# Patient Record
Sex: FEMALE | HISPANIC OR LATINO | Employment: UNEMPLOYED | URBAN - METROPOLITAN AREA
[De-identification: names, ages, dates, MRNs, and addresses within clinical notes are randomized per-mention and may not be internally consistent; named-entity substitution may affect disease eponyms.]

---

## 2017-09-22 ENCOUNTER — ALLSCRIPTS OFFICE VISIT (OUTPATIENT)
Dept: OTHER | Facility: OTHER | Age: 37
End: 2017-09-22

## 2017-10-25 ENCOUNTER — ALLSCRIPTS OFFICE VISIT (OUTPATIENT)
Dept: OTHER | Facility: OTHER | Age: 37
End: 2017-10-25

## 2017-10-25 DIAGNOSIS — F32.9 MAJOR DEPRESSIVE DISORDER, SINGLE EPISODE: ICD-10-CM

## 2017-10-26 NOTE — PROGRESS NOTES
Assessment  1  Depression with anxiety (300 4) (F41 8)    Plan  Acute depression    · Sertraline HCl - 50 MG Oral Tablet; TAKE 1 TABLET DAILY   · (1) CBC/PLT/DIFF; Status:Active; Requested [id number];    · (1) COMPREHENSIVE METABOLIC PANEL; Status:Active; Requested [id number];    · (1) TSH; Status:Active; Requested EXH:[id number];   Flu vaccine need    · Fluzone Quadrivalent Intramuscular Suspension    Discussion/Summary    Discussed uses of meds and possible side effects such as gi upset, headache, dreams, wt gain  will start meds and get lab eval  will consider mental health eval if not improving  Chief Complaint  1  Anxiety   2  Depression  pt wants to discuss depression and anxiety issues      History of Present Illness  pt with 36 year old thinks she started with depression post partum, feeling empty, always tired  is getting work done at home meals laundry etc  but feels unhappy  no thought of self harm or harming others      Review of Systems    Constitutional: feeling poorly  Eyes: No complaints of eye pain, no red eyes, no eyesight problems, no discharge, no dry eyes, no itching of eyes  ENT: no complaints of earache, no loss of hearing, no nose bleeds, no nasal discharge, no sore throat, no hoarseness  Cardiovascular: No complaints of slow heart rate, no fast heart rate, no chest pain, no palpitations, no leg claudication, no lower extremity edema  Respiratory: No complaints of shortness of breath, no wheezing, no cough, no SOB on exertion, no orthopnea, no PND  Gastrointestinal: No complaints of abdominal pain, no constipation, no nausea or vomiting, no diarrhea, no bloody stools  Genitourinary: No complaints of dysuria, no incontinence, no pelvic pain, no dysmenorrhea, no vaginal discharge or bleeding  Musculoskeletal: No complaints of arthralgias, no myalgias, no joint swelling or stiffness, no limb pain or swelling     Integumentary: No complaints of skin rash or lesions, no itching, no skin wounds, no breast pain or lump  Neurological: No complaints of headache, no confusion, no convulsions, no numbness, no dizziness or fainting, no tingling, no limb weakness, no difficulty walking  Psychiatric: as noted in HPI  Endocrine: No complaints of proptosis, no hot flashes, no muscle weakness, no deepening of the voice, no feelings of weakness  Hematologic/Lymphatic: No complaints of swollen glands, no swollen glands in the neck, does not bleed easily, does not bruise easily  Active Problems  1  Asthma (493 90) (J45 909)   2  Contraception (V25 9) (Z30 9)   3  Encounter for routine gynecological examination with Papanicolaou smear of cervix   (V72 31,V76 2) (Z01 419)   4  Need for Tdap vaccination (V06 1) (Z23)   5  Pregnancy (V22 2) (Z34 90)   6  Strain of lumbar region, initial encounter (847 2) (M29 681X)    Past Medical History  1  History of  (637 90) (O03 9)   2  History of ASCUS favor benign (796 9)   3  History of Bladder Incontinence   4  History of  3   5  History of Male infertility (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 9)   6  History of  (spontaneous vaginal delivery) (650) (O80)    The active problems and past medical history were reviewed and updated today  Surgical History  1  History of Surgically Induced  - By Dilation And Evacuation    Family History  Mother    1  Family history of Urinary Incontinence  Father    2  Family history of alcoholism (V17 0) (Z81 1)  Sister    3  Family history of Urinary Incontinence  Maternal Grandmother    4  Family history of Arthritis (V17 7)   5  Family history of Hypertension (V17 49)   6  Family history of Osteoporosis (V17 81)   7  Family history of Renal Disease  Maternal Grandfather    8  Family history of Diabetes Mellitus (V18 0)  Maternal Aunt    9  Family history of Anemia (V18 2)  Paternal Aunt    8  Family history of Anemia (V18 2)   11   Family history of Varicose Veins Of Lower Extremities    Social History · Always uses seat belt   · Being A Social Drinker   · Exercising Regularly   · Former smoker (T68 88) (P56 593)   ·   The social history was reviewed and updated today  The social history was reviewed and is unchanged  Current Meds   1  Ibuprofen 600 MG Oral Tablet; TAKE 1 TABLET 3 times daily; Therapy: 86RAY6642 to (Evaluate:13Apr2016)  Requested for: 99YOD6472; Last   Rx:[id number] Ordered   2  Prenatal 28-0 8 MG Oral Tablet; Take 1 tablet daily Recorded   3  Proventil  (90 Base) MCG/ACT Inhalation Aerosol Solution; 2 PUFFS INH PRN; Therapy: 48SUB1374 to (Evaluate:19Rkj8768)  Requested for: 35DPR2190; Last   Rx:09Mar2017 Ordered   4  ZyrTEC Allergy 10 MG Oral Capsule; TAKE 1 CAPSULE Daily Recorded    The medication list was reviewed and updated today  Allergies  1  No Known Drug Allergies  2  Milk   3  Pollen    Vitals  Vital Signs    Recorded: 02STU8834 10:36AM   Heart Rate 74   Systolic 385   Diastolic 80   Height 5 ft 7 5 in   Weight 239 lb    BMI Calculated 36 88   BSA Calculated 2 19     Physical Exam    Constitutional   General appearance: No acute distress, well appearing and well nourished  Pulmonary   Auscultation of lungs: Clear to auscultation  Cardiovascular   Auscultation of heart: Normal rate and rhythm, normal S1 and S2, without murmurs  Examination of extremities for edema and/or varicosities: Normal     Lymphatic   Palpation of lymph nodes in neck: No lymphadenopathy  Skin   Skin and subcutaneous tissue: Normal without rashes or lesions  Psychiatric   Orientation to person, place, and time: Normal     Mood and affect: Abnormal   Mood and Affect: flat-- and-- restricted  Results/Data  PHQ-9 Adult Depression Screening 25Oct2017 10:38AM User, Ahs     Test Name Result Flag Reference   PHQ-9 Adult Depression Score 18     Over the last two weeks, how often have you been bothered by any of the following problems?   Little interest or pleasure in doing things: Nearly every day - 3  Feeling down, depressed, or hopeless: Nearly every day - 3  Trouble falling or staying asleep, or sleeping too much: More than half the days - 2  Feeling tired or having little energy: Nearly every day - 3  Poor appetite or over eating: Nearly every day - 3  Feeling bad about yourself - or that you are a failure or have let yourself or your family down: Nearly every day - 3  Trouble concentrating on things, such as reading the newspaper or watching television: Several days - 1  Moving or speaking so slowly that other people could have noticed  Or the opposite -  being so fidgety or restless that you have been moving around a lot more than usual: Not at all - 0  Thoughts that you would be better off dead, or of hurting yourself in some way: Not at all - 0   PHQ-9 Adult Depression Screening Positive     PHQ-9 Difficulty Level Not difficult at all     PHQ-9 Severity      Moderately Severe Depression       Future Appointments    Date/Time Provider Specialty Site   11/15/2017 10:30 AM Raoul Judge DO Family Medicine [place] [address]   09/28/2018 10:20 AM GIO Barakat   Obstetrics/Gynecology [facility]     Signatures   Electronically signed by : Lloyd Michelle DO; Oct 25 2017  1:25PM EST                       (Author)

## 2017-10-27 ENCOUNTER — LAB CONVERSION - ENCOUNTER (OUTPATIENT)
Dept: OTHER | Facility: OTHER | Age: 37
End: 2017-10-27

## 2017-10-27 ENCOUNTER — GENERIC CONVERSION - ENCOUNTER (OUTPATIENT)
Dept: OTHER | Facility: OTHER | Age: 37
End: 2017-10-27

## 2017-10-27 LAB
A/G RATIO (HISTORICAL): 1.6 (CALC) (ref 1–2.5)
ALBUMIN SERPL BCP-MCNC: 4.3 G/DL (ref 3.6–5.1)
ALP SERPL-CCNC: 82 U/L (ref 33–115)
ALT SERPL W P-5'-P-CCNC: 29 U/L (ref 6–29)
AST SERPL W P-5'-P-CCNC: 22 U/L (ref 10–30)
BASOPHILS # BLD AUTO: 0.6 %
BASOPHILS # BLD AUTO: 38 CELLS/UL (ref 0–200)
BILIRUB SERPL-MCNC: 0.5 MG/DL (ref 0.2–1.2)
BUN SERPL-MCNC: 12 MG/DL (ref 7–25)
BUN/CREA RATIO (HISTORICAL): NORMAL (CALC) (ref 6–22)
CALCIUM SERPL-MCNC: 9.2 MG/DL (ref 8.6–10.2)
CHLORIDE SERPL-SCNC: 103 MMOL/L (ref 98–110)
CO2 SERPL-SCNC: 27 MMOL/L (ref 20–31)
CREAT SERPL-MCNC: 0.83 MG/DL (ref 0.5–1.1)
DEPRECATED RDW RBC AUTO: 12.5 % (ref 11–15)
EGFR AFRICAN AMERICAN (HISTORICAL): 106 ML/MIN/1.73M2
EGFR-AMERICAN CALC (HISTORICAL): 91 ML/MIN/1.73M2
EOSINOPHIL # BLD AUTO: 198 CELLS/UL (ref 15–500)
EOSINOPHIL # BLD AUTO: 3.1 %
GAMMA GLOBULIN (HISTORICAL): 2.7 G/DL (CALC) (ref 1.9–3.7)
GLUCOSE (HISTORICAL): 97 MG/DL (ref 65–99)
HCT VFR BLD AUTO: 37.1 % (ref 35–45)
HGB BLD-MCNC: 12.2 G/DL (ref 11.7–15.5)
LYMPHOCYTES # BLD AUTO: 1606 CELLS/UL (ref 850–3900)
LYMPHOCYTES # BLD AUTO: 25.1 %
MCH RBC QN AUTO: 28.6 PG (ref 27–33)
MCHC RBC AUTO-ENTMCNC: 32.9 G/DL (ref 32–36)
MCV RBC AUTO: 87.1 FL (ref 80–100)
MONOCYTES # BLD AUTO: 480 CELLS/UL (ref 200–950)
MONOCYTES (HISTORICAL): 7.5 %
NEUTROPHILS # BLD AUTO: 4077 CELLS/UL (ref 1500–7800)
NEUTROPHILS # BLD AUTO: 63.7 %
PLATELET # BLD AUTO: 318 THOUSAND/UL (ref 140–400)
PMV BLD AUTO: 10.8 FL (ref 7.5–12.5)
POTASSIUM SERPL-SCNC: 4 MMOL/L (ref 3.5–5.3)
RBC # BLD AUTO: 4.26 MILLION/UL (ref 3.8–5.1)
SODIUM SERPL-SCNC: 137 MMOL/L (ref 135–146)
TOTAL PROTEIN (HISTORICAL): 7 G/DL (ref 6.1–8.1)
TSH SERPL DL<=0.05 MIU/L-ACNC: 9.42 MIU/L
WBC # BLD AUTO: 6.4 THOUSAND/UL (ref 3.8–10.8)

## 2017-11-15 ENCOUNTER — GENERIC CONVERSION - ENCOUNTER (OUTPATIENT)
Dept: OTHER | Facility: OTHER | Age: 37
End: 2017-11-15

## 2018-01-10 NOTE — RESULT NOTES
Verified Results  (1) CBC/PLT/DIFF 26Oct2017 08:54AM Rianna Hernández     Test Name Result Flag Reference   WHITE BLOOD CELL COUNT 6 4 Thousand/uL  3 8-10 8   RED BLOOD CELL COUNT 4 26 Million/uL  3 80-5 10   HEMOGLOBIN 12 2 g/dL  11 7-15 5   HEMATOCRIT 37 1 %  35 0-45 0   MCV 87 1 fL  80 0-100 0   MCH 28 6 pg  27 0-33 0   MCHC 32 9 g/dL  32 0-36 0   RDW 12 5 %  11 0-15 0   PLATELET COUNT 066 Thousand/uL  140-400   ABSOLUTE NEUTROPHILS 4077 cells/uL  5835-2777   ABSOLUTE LYMPHOCYTES 1606 cells/uL  850-3900   ABSOLUTE MONOCYTES 480 cells/uL  200-950   ABSOLUTE EOSINOPHILS 198 cells/uL     ABSOLUTE BASOPHILS 38 cells/uL  0-200   NEUTROPHILS 63 7 %     LYMPHOCYTES 25 1 %     MONOCYTES 7 5 %     EOSINOPHILS 3 1 %     BASOPHILS 0 6 %     MPV 10 8 fL  7 5-12 5     (1) COMPREHENSIVE METABOLIC PANEL [id number] 01:81NH Rianna Fryeoscar     Test Name Result Flag Reference   GLUCOSE 97 mg/dL  65-99   Fasting reference interval   UREA NITROGEN (BUN) 12 mg/dL  7-25   CREATININE 0 83 mg/dL  0 50-1 10   eGFR NON-AFR   AMERICAN 91 mL/min/1 73m2  > OR = 60   eGFR AFRICAN AMERICAN 106 mL/min/1 73m2  > OR = 60   BUN/CREATININE RATIO   8-87   NOT APPLICABLE (calc)   SODIUM 137 mmol/L  135-146   POTASSIUM 4 0 mmol/L  3 5-5 3   CHLORIDE 103 mmol/L     CARBON DIOXIDE 27 mmol/L  20-31   CALCIUM 9 2 mg/dL  8 6-10 2   PROTEIN, TOTAL 7 0 g/dL  6 1-8 1   ALBUMIN 4 3 g/dL  3 6-5 1   GLOBULIN 2 7 g/dL (calc)  1 9-3 7   ALBUMIN/GLOBULIN RATIO 1 6 (calc)  1 0-2 5   BILIRUBIN, TOTAL 0 5 mg/dL  0 2-1 2   ALKALINE PHOSPHATASE 82 U/L     AST 22 U/L  10-30   ALT 29 U/L  6-29     (Q) TSH, 3RD GENERATION 26Oct2017 08:54AM Rianna Hernández   REPORT COMMENT:  FASTING:NO     Test Name Result Flag Reference   TSH 9 42 mIU/L H    Reference Range                         > or = 20 Years  0 40-4 50                              Pregnancy Ranges            First trimester    0 26-2 66            Second trimester   0 55-2 73            Third trimester    0 43-2 91 Plan  Acquired hypothyroidism    · Levothyroxine Sodium 25 MCG Oral Tablet;  Take 1 tablet daily

## 2018-01-12 VITALS
DIASTOLIC BLOOD PRESSURE: 80 MMHG | SYSTOLIC BLOOD PRESSURE: 116 MMHG | HEIGHT: 68 IN | BODY MASS INDEX: 36.22 KG/M2 | WEIGHT: 239 LBS | HEART RATE: 74 BPM

## 2018-01-14 VITALS
SYSTOLIC BLOOD PRESSURE: 110 MMHG | DIASTOLIC BLOOD PRESSURE: 64 MMHG | WEIGHT: 235 LBS | BODY MASS INDEX: 35.61 KG/M2 | HEIGHT: 68 IN

## 2018-01-15 DIAGNOSIS — F32.9 MAJOR DEPRESSIVE DISORDER, SINGLE EPISODE: ICD-10-CM

## 2018-01-15 NOTE — PROGRESS NOTES
Discussion/Summary  health maintenance visit healthy adult female Currently, she eats a healthy diet and eats an adequate diet  the risks and benefits of cervical cancer screening were discussed cervical cancer screening is current Breast cancer screening: the risks and benefits of breast cancer screening were discussed and self breast exam technique was taught  Advice and education were given regarding nutrition, aerobic exercise, weight bearing exercise, calcium supplements and vitamin D supplements  Gynecologic yearly, without abnormal findings  Pap smear is current  2014  She had an ASCUS with a negative HPV  2015  She had a normal, her next Pap smear is due in 2017  We discussed strategies for dealing with PMS, which included exercise and more free time  Chief Complaint  Pt is here for her yearly exam, c/o hormonal changes  History of Present Illness  HPI: Patient is a 70-year-old female here for yearly gynecologic exam  She complains of increased moodiness and anger one week before the onset of menses  Patient is breast-feeding and not sleeping well at night  She does not exercise regularly  She denies missing work, Troubles at work, difficulty with marriage, or suicidal ideation  GYN HM, [facility]: The patient is being seen for a health maintenance evaluation  The last health maintenance visit was 1 year(s) ago  General Health: The patient's health since the last visit is described as good  Lifestyle:  She exercises regularly  She exercises less than three times a week  Exercise includes walking  She does not use tobacco  She consumes alcohol  She reports occasional alcohol use  She denies drug use  Reproductive health: the patient is premenopausal   she reports no menstrual problems  she uses no contraception  she is sexually active  pregnancy history: G 1P 1, 1  Screening: Cervical cancer screening includes a pap smear performed 10/7/2015,neg    and human papilloma virus screening performed 10/29/2014,neg  Breast cancer screening includes no previous mammogram  Colorectal cancer screening includes no previous colonoscopy  Review of Systems  no pelvic pain and no menorrhagia  Constitutional: No fever, no chills, feels well, no tiredness, no recent weight gain or loss  Breasts: no breast pain and no reddening of the breast       Active Problems    1  Asthma (493 90) (J45 909)   2  Contraception (V25 9) (Z30 9)   3  Encounter for routine gynecological examination with Papanicolaou smear of cervix   (V72 31,V76 2) (Z01 419)   4  Need for Tdap vaccination (V06 1) (Z23)   5  Pregnancy (V22 2) (Z33 1)   6  Strain of lumbar region, initial encounter (847 2) (S39 012A)    Past Medical History    · History of  (637 90) (O03 9)   · History of ASCUS favor benign (796 9)   · History of Bladder Incontinence   · History of  3   · History of Male infertility (606 9) (N46 9)   · History of  (spontaneous vaginal delivery) (650) (O80)    Surgical History    · History of Surgically Induced  - By Dilation And Evacuation    Family History  Mother    · Family history of Urinary Incontinence  Father    · Family history of alcoholism (V17 0) (Z81 1)  Sister    · Family history of Urinary Incontinence  Maternal Grandmother    · Family history of Arthritis (V17 7)   · Family history of Hypertension (V17 49)   · Family history of Osteoporosis (V17 81)   · Family history of Renal Disease  Maternal Grandfather    · Family history of Diabetes Mellitus (V18 0)  Maternal Aunt    · Family history of Anemia (V18 2)  Paternal Aunt    · Family history of Anemia (V18 2)   · Family history of Varicose Veins Of Lower Extremities    Social History    · Always uses seat belt   · Being A Social Drinker   · Exercising Regularly   · Former smoker (T44 96) (W13 965)   ·     Current Meds   1  Ibuprofen 600 MG Oral Tablet; TAKE 1 TABLET 3 times daily;    Therapy: 76TOC2301 to (Evaluate:74Amu5760)  Requested for: 48YCQ7958; Last   Rx:[id number] Ordered   2  Prenatal 28-0 8 MG Oral Tablet; Take 1 tablet daily Recorded   3  Proventil  (90 Base) MCG/ACT Inhalation Aerosol Solution; 2 PUFFS INH PRN; Therapy: 35EAX7596 to (Misty Hurd)  Requested for: 15XGX2014; Last   Rx:[id number] Ordered   4  ZyrTEC Allergy 10 MG Oral Capsule; TAKE 1 CAPSULE Daily Recorded    Allergies    1  No Known Drug Allergies    2  Milk   3  Pollen    Vitals   Recorded: 54ZGW7218 02:83EU   Systolic 327   Diastolic 68   LMP 59TRR8016   Height 5 ft 7 5 in   Weight 228 lb    BMI Calculated 35 18   BSA Calculated 2 15     Physical Exam    Constitutional   General appearance: No acute distress, well appearing and well nourished  Neck   Neck: Normal, supple, trachea midline, no masses  Thyroid: Normal, no thyromegaly  Pulmonary   Respiratory effort: No increased work of breathing or signs of respiratory distress  Auscultation of lungs: Clear to auscultation  Cardiovascular   Auscultation of heart: Normal rate and rhythm, normal S1 and S2, no murmurs  Peripheral vascular exam: Normal pulses Throughout  Genitourinary   External genitalia: Normal and no lesions appreciated  Vagina: Normal, no lesions or dryness appreciated  Urethra: Normal     Urethral meatus: Normal     Bladder: Normal, soft, non-tender and no prolapse or masses appreciated  Anus, perineum, and rectum: Normal sphincter tone, no masses, and no prolapse  Chest Normal lactating breasts  Abdomen   Abdomen: Normal, non-tender, and no organomegaly noted  Liver and spleen: No hepatomegaly or splenomegaly  Examination for hernias: No hernias appreciated  Lymphatic   Palpation of lymph nodes in neck, axillae, groin and/or other locations: No lymphadenopathy or masses noted  Skin   Skin and subcutaneous tissue: Normal skin turgor and no rashes      Palpation of skin and subcutaneous tissue: Normal  Psychiatric   Orientation to person, place, and time: Normal     Mood and affect: Normal        Future Appointments    Date/Time Provider Specialty Site   09/22/2017 10:20 AM GIO Mederos   Obstetrics/Gynecology [facility] OB     Signatures   Electronically signed by : GIO Escoto ; Sep  9 2016  1:20PM EST                       (Author)

## 2018-01-22 VITALS
DIASTOLIC BLOOD PRESSURE: 78 MMHG | HEART RATE: 76 BPM | SYSTOLIC BLOOD PRESSURE: 128 MMHG | HEIGHT: 68 IN | WEIGHT: 240.4 LBS | BODY MASS INDEX: 36.43 KG/M2 | RESPIRATION RATE: 20 BRPM

## 2018-11-01 ENCOUNTER — ANNUAL EXAM (OUTPATIENT)
Dept: OBGYN CLINIC | Facility: MEDICAL CENTER | Age: 38
End: 2018-11-01
Payer: COMMERCIAL

## 2018-11-01 ENCOUNTER — APPOINTMENT (OUTPATIENT)
Dept: LAB | Facility: MEDICAL CENTER | Age: 38
End: 2018-11-01
Payer: COMMERCIAL

## 2018-11-01 VITALS
BODY MASS INDEX: 35.88 KG/M2 | WEIGHT: 228.6 LBS | DIASTOLIC BLOOD PRESSURE: 82 MMHG | SYSTOLIC BLOOD PRESSURE: 122 MMHG | HEIGHT: 67 IN

## 2018-11-01 DIAGNOSIS — E02 SUBCLINICAL IODINE-DEFICIENCY HYPOTHYROIDISM: ICD-10-CM

## 2018-11-01 DIAGNOSIS — Z01.419 ENCOUNTER FOR GYNECOLOGICAL EXAMINATION (GENERAL) (ROUTINE) WITHOUT ABNORMAL FINDINGS: Primary | ICD-10-CM

## 2018-11-01 DIAGNOSIS — Z11.51 SCREENING FOR HUMAN PAPILLOMAVIRUS (HPV): ICD-10-CM

## 2018-11-01 DIAGNOSIS — E66.3 OVERWEIGHT: ICD-10-CM

## 2018-11-01 DIAGNOSIS — Z12.4 SCREENING FOR CERVICAL CANCER: ICD-10-CM

## 2018-11-01 PROCEDURE — 84439 ASSAY OF FREE THYROXINE: CPT

## 2018-11-01 PROCEDURE — 84443 ASSAY THYROID STIM HORMONE: CPT

## 2018-11-01 PROCEDURE — 99395 PREV VISIT EST AGE 18-39: CPT | Performed by: OBSTETRICS & GYNECOLOGY

## 2018-11-01 PROCEDURE — 87624 HPV HI-RISK TYP POOLED RSLT: CPT | Performed by: OBSTETRICS & GYNECOLOGY

## 2018-11-01 PROCEDURE — G0145 SCR C/V CYTO,THINLAYER,RESCR: HCPCS | Performed by: OBSTETRICS & GYNECOLOGY

## 2018-11-01 PROCEDURE — 36415 COLL VENOUS BLD VENIPUNCTURE: CPT

## 2018-11-01 RX ORDER — ALBUTEROL SULFATE 90 UG/1
2 AEROSOL, METERED RESPIRATORY (INHALATION)
COMMUNITY
Start: 2014-10-10 | End: 2019-05-23 | Stop reason: SDUPTHER

## 2018-11-01 RX ORDER — OMEPRAZOLE 10 MG/1
CAPSULE, DELAYED RELEASE ORAL
COMMUNITY
End: 2020-06-12

## 2018-11-01 NOTE — PROGRESS NOTES
Assessment/Plan:      Diagnoses and all orders for this visit:    Encounter for gynecological examination (general) (routine) without abnormal findings  -     Liquid-based pap, screening    Screening for human papillomavirus (HPV)  -     Liquid-based pap, screening    Screening for cervical cancer  -     Liquid-based pap, screening    Overweight  -     TSH, 3rd generation with Free T4 reflex; Future    Subclinical iodine-deficiency hypothyroidism  -     TSH, 3rd generation with Free T4 reflex; Future    Other orders  -     omeprazole (PRILOSEC) 10 mg delayed release capsule; Take by mouth  -     albuterol (PROVENTIL HFA) 90 mcg/act inhaler; Inhale 2 puffs  -     Cetirizine HCl (ZYRTEC ALLERGY) 10 MG CAPS; Take 1 capsule by mouth daily          Subjective:     Patient ID: Alonzo Lewis is a 40 y o  female  Patient is a 23-year-old female, para 1, here for yearly gynecologic exam without complaint  Review of systems is negative for vulvar vaginal or anal irritation  Negative for pelvic or abdominal pain  Negative for breast complaints  Patient states she gets regular menses  Past medical history is significant for mild asthma and possible hypothyroidism  Past surgical history none  Current medications are Proventil and Prilosec  No known drug allergies  Patient denies a family history of breast ovarian or colon cancer  Gynecologic Exam   The patient's pertinent negatives include no pelvic pain  Pertinent negatives include no abdominal pain  Review of Systems   Gastrointestinal: Negative for abdominal pain and rectal pain  Genitourinary: Negative for genital sores, menstrual problem and pelvic pain  Objective:     Physical Exam   Constitutional: She is oriented to person, place, and time  She appears well-developed and well-nourished  Neck: Neck supple  No thyromegaly present  Cardiovascular: Normal rate and regular rhythm      Pulmonary/Chest: Effort normal  No respiratory distress  She has no wheezes  She has no rales  She exhibits no tenderness  Right breast exhibits no inverted nipple, no mass, no nipple discharge, no skin change and no tenderness  Left breast exhibits no inverted nipple, no mass, no nipple discharge, no skin change and no tenderness  Abdominal: Soft  She exhibits no distension and no mass  There is no tenderness  There is no rebound and no guarding  Genitourinary: Vagina normal and uterus normal    Neurological: She is alert and oriented to person, place, and time  Skin: Skin is warm  Psychiatric: She has a normal mood and affect   Her behavior is normal

## 2018-11-02 ENCOUNTER — TELEPHONE (OUTPATIENT)
Dept: OBGYN CLINIC | Facility: CLINIC | Age: 38
End: 2018-11-02

## 2018-11-02 LAB
T4 FREE SERPL-MCNC: 1 NG/DL (ref 0.76–1.46)
TSH SERPL DL<=0.05 MIU/L-ACNC: 11 UIU/ML (ref 0.36–3.74)

## 2018-11-02 NOTE — TELEPHONE ENCOUNTER
----- Message from Newton Holbrook MD sent at 11/2/2018  8:40 AM EDT -----  Please notify patient her TSH is elevated her free T4 is normal   Suggestive of subclinical hypothyroidism  She should continue to follow with her primary care doctor

## 2018-11-02 NOTE — TELEPHONE ENCOUNTER
Patient called back - stated she found a PCP and will be going to him for there medication  Don't need to prescribe pt anything at this time

## 2018-11-02 NOTE — TELEPHONE ENCOUNTER
Patient is aware of TSH & T4 results  She stated she doesn't have a PCP and was told that Dr Meghan Ragland would prescribe her something for it till she does find one  Please advise  Thanks!

## 2018-11-05 LAB
HPV HR 12 DNA CVX QL NAA+PROBE: NEGATIVE
HPV16 DNA CVX QL NAA+PROBE: NEGATIVE
HPV18 DNA CVX QL NAA+PROBE: NEGATIVE

## 2018-11-07 LAB
LAB AP GYN PRIMARY INTERPRETATION: NORMAL
Lab: NORMAL

## 2018-11-08 ENCOUNTER — OFFICE VISIT (OUTPATIENT)
Dept: FAMILY MEDICINE CLINIC | Facility: CLINIC | Age: 38
End: 2018-11-08
Payer: COMMERCIAL

## 2018-11-08 VITALS
HEIGHT: 68 IN | WEIGHT: 226 LBS | OXYGEN SATURATION: 98 % | HEART RATE: 88 BPM | BODY MASS INDEX: 34.25 KG/M2 | RESPIRATION RATE: 16 BRPM | DIASTOLIC BLOOD PRESSURE: 76 MMHG | TEMPERATURE: 98.2 F | SYSTOLIC BLOOD PRESSURE: 98 MMHG

## 2018-11-08 DIAGNOSIS — E66.9 CLASS 1 OBESITY WITHOUT SERIOUS COMORBIDITY WITH BODY MASS INDEX (BMI) OF 34.0 TO 34.9 IN ADULT, UNSPECIFIED OBESITY TYPE: ICD-10-CM

## 2018-11-08 DIAGNOSIS — Z23 FLU VACCINE NEED: Primary | ICD-10-CM

## 2018-11-08 DIAGNOSIS — E03.9 ACQUIRED HYPOTHYROIDISM: ICD-10-CM

## 2018-11-08 PROCEDURE — 90686 IIV4 VACC NO PRSV 0.5 ML IM: CPT | Performed by: INTERNAL MEDICINE

## 2018-11-08 PROCEDURE — 1036F TOBACCO NON-USER: CPT | Performed by: INTERNAL MEDICINE

## 2018-11-08 PROCEDURE — 99203 OFFICE O/P NEW LOW 30 MIN: CPT | Performed by: INTERNAL MEDICINE

## 2018-11-08 PROCEDURE — 3008F BODY MASS INDEX DOCD: CPT | Performed by: INTERNAL MEDICINE

## 2018-11-08 PROCEDURE — 90471 IMMUNIZATION ADMIN: CPT | Performed by: INTERNAL MEDICINE

## 2018-11-08 RX ORDER — LEVOTHYROXINE SODIUM 0.05 MG/1
50 TABLET ORAL DAILY
Qty: 30 TABLET | Refills: 1 | Status: SHIPPED | OUTPATIENT
Start: 2018-11-08 | End: 2018-11-14 | Stop reason: SDUPTHER

## 2018-11-08 NOTE — PROGRESS NOTES
Assessment/Plan:         Diagnoses and all orders for this visit:    Flu vaccine need  -     SYRINGE/SINGLE-DOSE VIAL: influenza vaccine, 2713-2417, quadrivalent, 0 5 mL, preservative-free, for patients 3+ yr (FLUZONE)    Acquired hypothyroidism  -     levothyroxine 50 mcg tablet; Take 1 tablet (50 mcg total) by mouth daily  -     TSH, 3rd generation; Future  -     T4, free; Future    Class 1 obesity without serious comorbidity with body mass index (BMI) of 34 0 to 34 9 in adult, unspecified obesity type    Other orders  -     Prenatal Vit-Fe Fumarate-FA (PRENATAL VITAMIN PO); Take by mouth          Subjective:      Patient ID: Romana Schmidt is a 40 y o  female  Pt ran out of thyroid med  She moved and had to find a new dr   +fatigued and it is worse off med  Off her med at least 1 month if not more  Loosing some hair  Denies constip  +depressed  Needs to go back on med        The following portions of the patient's history were reviewed and updated as appropriate:   She  has a past medical history of Bladder incontinence; Disease of thyroid gland; Mental disorder; and Pap smear abnormality of cervix with ASCUS favoring benign (10/29/2014)  She   Patient Active Problem List    Diagnosis Date Noted    Acquired hypothyroidism 10/27/2017    Strain of lumbar region 10/16/2015    Asthma 10/10/2014     She  has a past surgical history that includes Dilation and evacuation  Her family history includes Alcohol abuse in her father and mother; Anemia in her maternal aunt and paternal aunt; Arthritis in her maternal grandmother; Diabetes in her maternal grandfather; Hypertension in her maternal grandmother; Kidney disease in her maternal grandmother; Mental illness in her father and mother; Osteoporosis in her maternal grandmother; Other in her mother and sister; Substance Abuse in her father and mother; Varicose Veins in her paternal aunt  She  reports that she has quit smoking   She has never used smokeless tobacco  She reports that she drinks alcohol  She reports that she does not use drugs  Current Outpatient Prescriptions   Medication Sig Dispense Refill    albuterol (PROVENTIL HFA) 90 mcg/act inhaler Inhale 2 puffs      Cetirizine HCl (ZYRTEC ALLERGY) 10 MG CAPS Take 1 capsule by mouth daily      omeprazole (PRILOSEC) 10 mg delayed release capsule Take by mouth      Prenatal Vit-Fe Fumarate-FA (PRENATAL VITAMIN PO) Take by mouth      levothyroxine 50 mcg tablet Take 1 tablet (50 mcg total) by mouth daily 30 tablet 1     No current facility-administered medications for this visit  Current Outpatient Prescriptions on File Prior to Visit   Medication Sig    albuterol (PROVENTIL HFA) 90 mcg/act inhaler Inhale 2 puffs    Cetirizine HCl (ZYRTEC ALLERGY) 10 MG CAPS Take 1 capsule by mouth daily    omeprazole (PRILOSEC) 10 mg delayed release capsule Take by mouth     No current facility-administered medications on file prior to visit  She is allergic to lac bovis and pollen extract       Review of Systems   Constitutional: Negative  HENT: Negative  Respiratory: Negative  Cardiovascular: Negative  Gastrointestinal: Negative for constipation  Objective:      BP 98/76 (BP Location: Right arm, Patient Position: Sitting, Cuff Size: Large)   Pulse 88   Temp 98 2 °F (36 8 °C) (Tympanic)   Resp 16   Ht 5' 7 5" (1 715 m)   Wt 103 kg (226 lb)   LMP 10/25/2018 (Exact Date)   SpO2 98%   BMI 34 87 kg/m²          Physical Exam   Constitutional: She appears well-developed and well-nourished  HENT:   Head: Normocephalic and atraumatic  Right Ear: External ear normal    Left Ear: External ear normal    Nose: Nose normal    Mouth/Throat: Oropharynx is clear and moist    Neck: Normal range of motion  Neck supple  Cardiovascular: Normal rate, regular rhythm and normal heart sounds  Pulmonary/Chest: Effort normal and breath sounds normal    Abdominal: Soft   Bowel sounds are normal

## 2018-11-14 DIAGNOSIS — E03.9 ACQUIRED HYPOTHYROIDISM: ICD-10-CM

## 2018-11-14 RX ORDER — LEVOTHYROXINE SODIUM 0.05 MG/1
50 TABLET ORAL DAILY
Qty: 90 TABLET | Refills: 1 | Status: SHIPPED | OUTPATIENT
Start: 2018-11-14 | End: 2018-11-16 | Stop reason: SDUPTHER

## 2018-11-16 DIAGNOSIS — E03.9 ACQUIRED HYPOTHYROIDISM: ICD-10-CM

## 2018-11-16 RX ORDER — LEVOTHYROXINE SODIUM 0.05 MG/1
50 TABLET ORAL DAILY
Qty: 90 TABLET | Refills: 1 | Status: SHIPPED | OUTPATIENT
Start: 2018-11-16 | End: 2019-01-08 | Stop reason: SDUPTHER

## 2019-01-06 DIAGNOSIS — E03.9 ACQUIRED HYPOTHYROIDISM: ICD-10-CM

## 2019-01-06 RX ORDER — LEVOTHYROXINE SODIUM 0.05 MG/1
50 TABLET ORAL DAILY
Qty: 90 TABLET | Refills: 0 | Status: CANCELLED | OUTPATIENT
Start: 2019-01-06

## 2019-01-07 DIAGNOSIS — E03.9 ACQUIRED HYPOTHYROIDISM: ICD-10-CM

## 2019-01-07 RX ORDER — LEVOTHYROXINE SODIUM 0.05 MG/1
50 TABLET ORAL DAILY
Qty: 90 TABLET | Refills: 0 | Status: CANCELLED | OUTPATIENT
Start: 2019-01-07

## 2019-01-08 DIAGNOSIS — E03.9 ACQUIRED HYPOTHYROIDISM: ICD-10-CM

## 2019-01-08 RX ORDER — LEVOTHYROXINE SODIUM 0.05 MG/1
50 TABLET ORAL DAILY
Qty: 90 TABLET | Refills: 0 | Status: SHIPPED | OUTPATIENT
Start: 2019-01-08 | End: 2019-01-10 | Stop reason: SDUPTHER

## 2019-01-08 RX ORDER — LEVOTHYROXINE SODIUM 0.05 MG/1
50 TABLET ORAL DAILY
Qty: 30 TABLET | Refills: 0 | Status: CANCELLED | OUTPATIENT
Start: 2019-01-08

## 2019-01-09 ENCOUNTER — APPOINTMENT (OUTPATIENT)
Dept: LAB | Facility: MEDICAL CENTER | Age: 39
End: 2019-01-09
Payer: COMMERCIAL

## 2019-01-09 DIAGNOSIS — E03.9 ACQUIRED HYPOTHYROIDISM: ICD-10-CM

## 2019-01-09 DIAGNOSIS — F32.9 MAJOR DEPRESSIVE DISORDER, SINGLE EPISODE: ICD-10-CM

## 2019-01-09 LAB
T4 FREE SERPL-MCNC: 1.17 NG/DL (ref 0.76–1.46)
TSH SERPL DL<=0.05 MIU/L-ACNC: 5.47 UIU/ML (ref 0.36–3.74)

## 2019-01-09 PROCEDURE — 36415 COLL VENOUS BLD VENIPUNCTURE: CPT

## 2019-01-09 PROCEDURE — 84439 ASSAY OF FREE THYROXINE: CPT

## 2019-01-09 PROCEDURE — 84443 ASSAY THYROID STIM HORMONE: CPT

## 2019-01-10 ENCOUNTER — TELEPHONE (OUTPATIENT)
Dept: FAMILY MEDICINE CLINIC | Facility: CLINIC | Age: 39
End: 2019-01-10

## 2019-01-10 DIAGNOSIS — E03.9 ACQUIRED HYPOTHYROIDISM: ICD-10-CM

## 2019-01-10 RX ORDER — LEVOTHYROXINE SODIUM 0.07 MG/1
75 TABLET ORAL DAILY
Qty: 30 TABLET | Refills: 1 | Status: SHIPPED | OUTPATIENT
Start: 2019-01-10 | End: 2019-01-15 | Stop reason: SDUPTHER

## 2019-01-13 DIAGNOSIS — E03.9 ACQUIRED HYPOTHYROIDISM: ICD-10-CM

## 2019-01-15 DIAGNOSIS — E03.9 ACQUIRED HYPOTHYROIDISM: ICD-10-CM

## 2019-01-15 RX ORDER — LEVOTHYROXINE SODIUM 0.07 MG/1
75 TABLET ORAL DAILY
Qty: 90 TABLET | Refills: 1 | Status: SHIPPED | OUTPATIENT
Start: 2019-01-15 | End: 2019-10-02 | Stop reason: SDUPTHER

## 2019-05-23 ENCOUNTER — OFFICE VISIT (OUTPATIENT)
Dept: FAMILY MEDICINE CLINIC | Facility: CLINIC | Age: 39
End: 2019-05-23
Payer: COMMERCIAL

## 2019-05-23 ENCOUNTER — APPOINTMENT (OUTPATIENT)
Dept: LAB | Facility: MEDICAL CENTER | Age: 39
End: 2019-05-23
Payer: COMMERCIAL

## 2019-05-23 ENCOUNTER — TELEPHONE (OUTPATIENT)
Dept: OTHER | Facility: OTHER | Age: 39
End: 2019-05-23

## 2019-05-23 VITALS
TEMPERATURE: 98.5 F | RESPIRATION RATE: 16 BRPM | WEIGHT: 210 LBS | HEART RATE: 78 BPM | DIASTOLIC BLOOD PRESSURE: 72 MMHG | BODY MASS INDEX: 31.83 KG/M2 | SYSTOLIC BLOOD PRESSURE: 110 MMHG | OXYGEN SATURATION: 98 % | HEIGHT: 68 IN

## 2019-05-23 DIAGNOSIS — E03.9 ACQUIRED HYPOTHYROIDISM: ICD-10-CM

## 2019-05-23 DIAGNOSIS — J06.9 UPPER RESPIRATORY TRACT INFECTION, UNSPECIFIED TYPE: Primary | ICD-10-CM

## 2019-05-23 LAB
T4 FREE SERPL-MCNC: 1.28 NG/DL (ref 0.76–1.46)
TSH SERPL DL<=0.05 MIU/L-ACNC: 3.39 UIU/ML (ref 0.36–3.74)

## 2019-05-23 PROCEDURE — 84439 ASSAY OF FREE THYROXINE: CPT

## 2019-05-23 PROCEDURE — 36415 COLL VENOUS BLD VENIPUNCTURE: CPT

## 2019-05-23 PROCEDURE — 84443 ASSAY THYROID STIM HORMONE: CPT

## 2019-05-23 PROCEDURE — 99213 OFFICE O/P EST LOW 20 MIN: CPT | Performed by: INTERNAL MEDICINE

## 2019-05-23 PROCEDURE — 3008F BODY MASS INDEX DOCD: CPT | Performed by: INTERNAL MEDICINE

## 2019-05-23 RX ORDER — ALBUTEROL SULFATE 90 UG/1
2 AEROSOL, METERED RESPIRATORY (INHALATION) EVERY 4 HOURS PRN
Qty: 1 INHALER | Refills: 2 | Status: SHIPPED | OUTPATIENT
Start: 2019-05-23 | End: 2020-06-12 | Stop reason: SDUPTHER

## 2019-05-23 RX ORDER — AMOXICILLIN 500 MG/1
500 CAPSULE ORAL EVERY 8 HOURS SCHEDULED
Qty: 30 CAPSULE | Refills: 0 | Status: SHIPPED | OUTPATIENT
Start: 2019-05-23 | End: 2019-06-02

## 2019-05-30 ENCOUNTER — OFFICE VISIT (OUTPATIENT)
Dept: FAMILY MEDICINE CLINIC | Facility: CLINIC | Age: 39
End: 2019-05-30
Payer: COMMERCIAL

## 2019-05-30 VITALS
HEIGHT: 68 IN | TEMPERATURE: 98.2 F | BODY MASS INDEX: 31.22 KG/M2 | RESPIRATION RATE: 16 BRPM | SYSTOLIC BLOOD PRESSURE: 110 MMHG | WEIGHT: 206 LBS | OXYGEN SATURATION: 98 % | DIASTOLIC BLOOD PRESSURE: 78 MMHG | HEART RATE: 82 BPM

## 2019-05-30 DIAGNOSIS — E03.9 ACQUIRED HYPOTHYROIDISM: Primary | ICD-10-CM

## 2019-05-30 DIAGNOSIS — J45.909 UNCOMPLICATED ASTHMA, UNSPECIFIED ASTHMA SEVERITY, UNSPECIFIED WHETHER PERSISTENT: ICD-10-CM

## 2019-05-30 PROCEDURE — 99213 OFFICE O/P EST LOW 20 MIN: CPT | Performed by: INTERNAL MEDICINE

## 2019-05-30 PROCEDURE — 1036F TOBACCO NON-USER: CPT | Performed by: INTERNAL MEDICINE

## 2019-10-02 ENCOUNTER — OFFICE VISIT (OUTPATIENT)
Dept: FAMILY MEDICINE CLINIC | Facility: CLINIC | Age: 39
End: 2019-10-02
Payer: COMMERCIAL

## 2019-10-02 VITALS
SYSTOLIC BLOOD PRESSURE: 100 MMHG | HEIGHT: 68 IN | RESPIRATION RATE: 16 BRPM | TEMPERATURE: 97.5 F | DIASTOLIC BLOOD PRESSURE: 76 MMHG | OXYGEN SATURATION: 98 % | HEART RATE: 79 BPM | WEIGHT: 181 LBS | BODY MASS INDEX: 27.43 KG/M2

## 2019-10-02 DIAGNOSIS — E03.9 ACQUIRED HYPOTHYROIDISM: ICD-10-CM

## 2019-10-02 DIAGNOSIS — R53.83 FATIGUE, UNSPECIFIED TYPE: ICD-10-CM

## 2019-10-02 DIAGNOSIS — Z23 ENCOUNTER FOR IMMUNIZATION: ICD-10-CM

## 2019-10-02 DIAGNOSIS — E66.3 OVERWEIGHT (BMI 25.0-29.9): Primary | ICD-10-CM

## 2019-10-02 PROCEDURE — 99213 OFFICE O/P EST LOW 20 MIN: CPT | Performed by: INTERNAL MEDICINE

## 2019-10-02 PROCEDURE — 3008F BODY MASS INDEX DOCD: CPT | Performed by: INTERNAL MEDICINE

## 2019-10-02 PROCEDURE — 90686 IIV4 VACC NO PRSV 0.5 ML IM: CPT | Performed by: INTERNAL MEDICINE

## 2019-10-02 PROCEDURE — 90471 IMMUNIZATION ADMIN: CPT | Performed by: INTERNAL MEDICINE

## 2019-10-02 RX ORDER — LEVOTHYROXINE SODIUM 0.07 MG/1
75 TABLET ORAL DAILY
Qty: 90 TABLET | Refills: 1 | Status: SHIPPED | OUTPATIENT
Start: 2019-10-02 | End: 2020-04-04

## 2019-10-02 NOTE — PROGRESS NOTES
Assessment/Plan:         Diagnoses and all orders for this visit:    Overweight (BMI 25 0-29 9)  -     Comprehensive metabolic panel; Future  -     Lipid panel; Future    Encounter for immunization  -     Cancel: influenza vaccine, [id number], quadrivalent, recombinant, PF, 0 5 mL, for patients 18 yr+ (FLUBLOK)  -     influenza vaccine, [id number], quadrivalent, 0 5 mL, preservative-free, for adult and pediatric patients 6 mos+ (AFLURIA, FLUARIX, FLULAVAL, FLUZONE)    Acquired hypothyroidism  -     levothyroxine 75 mcg tablet; Take 1 tablet (75 mcg total) by mouth daily  -     TSH, 3rd generation with Free T4 reflex; Future    Fatigue, unspecified type  -     CBC; Future          Subjective:      Patient ID: [place] He is a 41 y o  female  Pt needs rx for thyroid med  Had the flu shot  Denies s/s of hyper/hypo throid      The following portions of the patient's history were reviewed and updated as appropriate: She  has a past medical history of Bladder incontinence, Disease of thyroid gland, Mental disorder, and Pap smear abnormality of cervix with ASCUS favoring benign (10/29/2014)  She   Patient Active Problem List    Diagnosis Date Noted    Acquired hypothyroidism 10/27/2017    Strain of lumbar region 10/16/2015    Asthma 10/10/2014     She  has a past surgical history that includes Dilation and evacuation  Her family history includes Alcohol abuse in her father and mother; Anemia in her maternal aunt and paternal aunt; Arthritis in her maternal grandmother; Diabetes in her maternal grandfather; Hypertension in her maternal grandmother; Kidney disease in her maternal grandmother; Mental illness in her father and mother; Osteoporosis in her maternal grandmother; Other in her mother and sister; Substance Abuse in her father and mother; Varicose Veins in her paternal aunt  She  reports that she has quit smoking  She has never used smokeless tobacco  She reports that she drinks alcohol   She reports that she does not use drugs  Current Outpatient Medications   Medication Sig Dispense Refill    albuterol (PROVENTIL HFA) 90 mcg/act inhaler Inhale 2 puffs every 4 (four) hours as needed for wheezing 1 Inhaler 2    Cetirizine HCl (ZYRTEC ALLERGY) 10 MG CAPS Take 1 capsule by mouth daily      levothyroxine 75 mcg tablet Take 1 tablet (75 mcg total) by mouth daily 90 tablet 1    omeprazole (PRILOSEC) 10 mg delayed release capsule Take by mouth      Prenatal Vit-Fe Fumarate-FA (PRENATAL VITAMIN PO) Take by mouth       No current facility-administered medications for this visit  Current Outpatient Medications on File Prior to Visit   Medication Sig    albuterol (PROVENTIL HFA) 90 mcg/act inhaler Inhale 2 puffs every 4 (four) hours as needed for wheezing    Cetirizine HCl (ZYRTEC ALLERGY) 10 MG CAPS Take 1 capsule by mouth daily    omeprazole (PRILOSEC) 10 mg delayed release capsule Take by mouth    Prenatal Vit-Fe Fumarate-FA (PRENATAL VITAMIN PO) Take by mouth     No current facility-administered medications on file prior to visit  She is allergic to lac bovis and pollen extract       Review of Systems   Constitutional: Negative  HENT: Negative  Respiratory: Negative  Cardiovascular: Negative  Negative for palpitations  Gastrointestinal: Negative for diarrhea  Objective:      /76 (BP Location: Right arm, Patient Position: Sitting, Cuff Size: Large)   Pulse 79   Temp 97 5 °F (36 4 °C) (Tympanic)   Resp 16   Ht 5' 7 5" (1 715 m)   Wt 82 1 kg (181 lb)   LMP 09/24/2019 (Exact Date)   SpO2 98%   BMI 27 93 kg/m²          Physical Exam   Constitutional: She appears well-developed and well-nourished  No distress  HENT:   Head: Normocephalic and atraumatic  Right Ear: External ear normal    Left Ear: External ear normal    Nose: Nose normal    Mouth/Throat: Oropharynx is clear and moist  No oropharyngeal exudate  Neck: Neck supple  No tracheal deviation present   No thyromegaly present  Cardiovascular: Normal rate, regular rhythm, normal heart sounds and intact distal pulses  Exam reveals no gallop and no friction rub  No murmur heard  Pulmonary/Chest: Effort normal and breath sounds normal  No stridor  No respiratory distress  She has no wheezes  She has no rales  Lymphadenopathy:     She has no cervical adenopathy  Skin: She is not diaphoretic

## 2020-01-22 ENCOUNTER — ANNUAL EXAM (OUTPATIENT)
Dept: OBGYN CLINIC | Facility: MEDICAL CENTER | Age: 40
End: 2020-01-22
Payer: COMMERCIAL

## 2020-01-22 VITALS — SYSTOLIC BLOOD PRESSURE: 116 MMHG | DIASTOLIC BLOOD PRESSURE: 82 MMHG | BODY MASS INDEX: 26.7 KG/M2 | WEIGHT: 173 LBS

## 2020-01-22 DIAGNOSIS — R68.82 DECREASED LIBIDO WITHOUT SEXUAL DYSFUNCTION: ICD-10-CM

## 2020-01-22 DIAGNOSIS — Z01.419 ENCOUNTER FOR GYNECOLOGICAL EXAMINATION (GENERAL) (ROUTINE) WITHOUT ABNORMAL FINDINGS: Primary | ICD-10-CM

## 2020-01-22 PROCEDURE — 99395 PREV VISIT EST AGE 18-39: CPT | Performed by: NURSE PRACTITIONER

## 2020-01-22 NOTE — ASSESSMENT & PLAN NOTE
Decreased desire to initiate sex since her daughter was born 4+ yrs ago  Relationship is good although their schedules don't match well due to his work hours  Normal physical exam today  Reassurance provided that I have low suspicion of underlying medical cause for decreased libido; no labs or imaging indicated  Recommended scheduling time alone without daughter, as her mother is willing to have her overnight  Also advised scheduling date nights 1-2 times per week when timing for intercourse is best  She agrees to plan

## 2020-01-22 NOTE — PROGRESS NOTES
Assessment/Plan:    Decreased libido without sexual dysfunction  Decreased desire to initiate sex since her daughter was born 4+ yrs ago  Relationship is good although their schedules don't match well due to his work hours  Normal physical exam today  Reassurance provided that I have low suspicion of underlying medical cause for decreased libido; no labs or imaging indicated  Recommended scheduling time alone without daughter, as her mother is willing to have her overnight  Also advised scheduling date nights 1-2 times per week when timing for intercourse is best  She agrees to plan  Encounter for gynecological examination (general) (routine) without abnormal findings  Normal findings on routine gyn exam  Advised monthly SBE, annual CBE and baseline screening mammo at age 37  Reviewed ASCCP guidelines and recommended pap with cotesting q3 yrs for this low risk patient; this was noted to be up to date  STI testing was offered and the patient declines; she reports low risk  The patient declines contraception at this time  She is taking daily PNV  Diet/activity recommendations:  Encouraged daily Ca++ and vitamin D intake as well as daily weight bearing exercise for promotion of bone health  RTO in one year or sooner PRN  Diagnoses and all orders for this visit:    Encounter for gynecological examination (general) (routine) without abnormal findings    Decreased libido without sexual dysfunction          Subjective:      Patient ID: Zaria Labor is a 46 y o  female  This patient presents for routine annual gyn exam    Medically stable  C/o decreased libido since daughter was born  Relationship is good  Denies difficulty achieving orgasm or dyspareunia  They were planning another baby but they have not been having sex due to her lack of desire  She reports feeling like at the end of the day she does not have enough energy for intimacy   He goes to bed at 7 and leaves for work at 3:30 so timing is diffuclt  Her mother is happy to have daughter overnight but they don't often take advantage of this  She denies acute gyn complaints  Menses are regular and light to mod  She denies pelvic pain, breast concerns, abnormal discharge, bowel/bladder dysfunction, depression/anxiety   and monogamous  She denies STI concerns  Not using contraception and declines at this time  Daughter starts  in the fall       The following portions of the patient's history were reviewed and updated as appropriate: allergies, current medications, past family history, past medical history, past social history, past surgical history and problem list     Review of Systems   Constitutional: Negative  Respiratory: Negative  Cardiovascular: Negative  Gastrointestinal: Negative  Genitourinary: Negative  Musculoskeletal: Negative  Skin: Negative  Neurological: Negative  Psychiatric/Behavioral: Negative  Objective:      /82   Wt 78 5 kg (173 lb)   LMP 01/12/2020   BMI 26 70 kg/m²          Physical Exam   Constitutional: She is oriented to person, place, and time  She appears well-developed and well-nourished  HENT:   Head: Normocephalic and atraumatic  Eyes: Pupils are equal, round, and reactive to light  EOM are normal    Neck: Normal range of motion  Neck supple  No thyromegaly present  Cardiovascular: Normal rate, regular rhythm and normal heart sounds  Pulmonary/Chest: Effort normal and breath sounds normal  No respiratory distress  She has no wheezes  She has no rales  She exhibits no mass, no tenderness and no deformity  Right breast exhibits no inverted nipple, no mass, no nipple discharge, no skin change and no tenderness  Left breast exhibits no inverted nipple, no mass, no nipple discharge, no skin change and no tenderness  No breast tenderness or discharge  Breasts are symmetrical    Abdominal: Soft  She exhibits no distension and no mass   There is no splenomegaly or hepatomegaly  There is no tenderness  There is no rebound and no guarding  Genitourinary: Rectum normal, vagina normal and uterus normal  No breast tenderness or discharge  No labial fusion  There is no rash, tenderness, lesion or injury on the right labia  There is no rash, tenderness, lesion or injury on the left labia  Cervix exhibits no motion tenderness, no discharge and no friability  Right adnexum displays no mass, no tenderness and no fullness  Left adnexum displays no mass, no tenderness and no fullness  No erythema, tenderness or bleeding in the vagina  No foreign body in the vagina  No vaginal discharge found  Musculoskeletal: Normal range of motion  Lymphadenopathy:     She has no cervical adenopathy  She has no axillary adenopathy  Neurological: She is alert and oriented to person, place, and time  No cranial nerve deficit  Skin: Skin is warm and dry  No rash noted  No cyanosis  Nails show no clubbing  Psychiatric: She has a normal mood and affect   Her speech is normal and behavior is normal  Judgment and thought content normal  Cognition and memory are normal

## 2020-01-22 NOTE — ASSESSMENT & PLAN NOTE
Normal findings on routine gyn exam  Advised monthly SBE, annual CBE and baseline screening mammo at age 37  Reviewed ASCCP guidelines and recommended pap with cotesting q3 yrs for this low risk patient; this was noted to be up to date  STI testing was offered and the patient declines; she reports low risk  The patient declines contraception at this time  She is taking daily PNV  Diet/activity recommendations:  Encouraged daily Ca++ and vitamin D intake as well as daily weight bearing exercise for promotion of bone health  RTO in one year or sooner PRN

## 2020-04-04 DIAGNOSIS — E03.9 ACQUIRED HYPOTHYROIDISM: ICD-10-CM

## 2020-04-04 RX ORDER — LEVOTHYROXINE SODIUM 0.07 MG/1
TABLET ORAL
Qty: 90 TABLET | Refills: 1 | Status: SHIPPED | OUTPATIENT
Start: 2020-04-04 | End: 2020-06-12 | Stop reason: SDUPTHER

## 2020-05-14 ENCOUNTER — TELEPHONE (OUTPATIENT)
Dept: FAMILY MEDICINE CLINIC | Facility: CLINIC | Age: 40
End: 2020-05-14

## 2020-06-03 ENCOUNTER — APPOINTMENT (OUTPATIENT)
Dept: LAB | Facility: MEDICAL CENTER | Age: 40
End: 2020-06-03
Payer: COMMERCIAL

## 2020-06-03 DIAGNOSIS — E03.9 ACQUIRED HYPOTHYROIDISM: ICD-10-CM

## 2020-06-03 DIAGNOSIS — R53.83 FATIGUE, UNSPECIFIED TYPE: ICD-10-CM

## 2020-06-03 DIAGNOSIS — E66.3 OVERWEIGHT (BMI 25.0-29.9): ICD-10-CM

## 2020-06-03 LAB
ALBUMIN SERPL BCP-MCNC: 4.3 G/DL (ref 3.5–5)
ALP SERPL-CCNC: 61 U/L (ref 46–116)
ALT SERPL W P-5'-P-CCNC: 17 U/L (ref 12–78)
ANION GAP SERPL CALCULATED.3IONS-SCNC: 6 MMOL/L (ref 4–13)
AST SERPL W P-5'-P-CCNC: 13 U/L (ref 5–45)
BILIRUB SERPL-MCNC: 0.64 MG/DL (ref 0.2–1)
BUN SERPL-MCNC: 12 MG/DL (ref 5–25)
CALCIUM SERPL-MCNC: 9.5 MG/DL (ref 8.3–10.1)
CHLORIDE SERPL-SCNC: 107 MMOL/L (ref 100–108)
CHOLEST SERPL-MCNC: 165 MG/DL (ref 50–200)
CO2 SERPL-SCNC: 25 MMOL/L (ref 21–32)
CREAT SERPL-MCNC: 0.9 MG/DL (ref 0.6–1.3)
ERYTHROCYTE [DISTWIDTH] IN BLOOD BY AUTOMATED COUNT: 12.2 % (ref 11.6–15.1)
GFR SERPL CREATININE-BSD FRML MDRD: 81 ML/MIN/1.73SQ M
GLUCOSE P FAST SERPL-MCNC: 87 MG/DL (ref 65–99)
HCT VFR BLD AUTO: 39 % (ref 34.8–46.1)
HDLC SERPL-MCNC: 64 MG/DL
HGB BLD-MCNC: 12.7 G/DL (ref 11.5–15.4)
LDLC SERPL CALC-MCNC: 91 MG/DL (ref 0–100)
MCH RBC QN AUTO: 30.8 PG (ref 26.8–34.3)
MCHC RBC AUTO-ENTMCNC: 32.6 G/DL (ref 31.4–37.4)
MCV RBC AUTO: 94 FL (ref 82–98)
NONHDLC SERPL-MCNC: 101 MG/DL
PLATELET # BLD AUTO: 308 THOUSANDS/UL (ref 149–390)
PMV BLD AUTO: 10.5 FL (ref 8.9–12.7)
POTASSIUM SERPL-SCNC: 3.8 MMOL/L (ref 3.5–5.3)
PROT SERPL-MCNC: 7.5 G/DL (ref 6.4–8.2)
RBC # BLD AUTO: 4.13 MILLION/UL (ref 3.81–5.12)
SODIUM SERPL-SCNC: 138 MMOL/L (ref 136–145)
TRIGL SERPL-MCNC: 49 MG/DL
TSH SERPL DL<=0.05 MIU/L-ACNC: 3.72 UIU/ML (ref 0.36–3.74)
WBC # BLD AUTO: 7.86 THOUSAND/UL (ref 4.31–10.16)

## 2020-06-03 PROCEDURE — 36415 COLL VENOUS BLD VENIPUNCTURE: CPT

## 2020-06-03 PROCEDURE — 85027 COMPLETE CBC AUTOMATED: CPT

## 2020-06-03 PROCEDURE — 80061 LIPID PANEL: CPT

## 2020-06-03 PROCEDURE — 80053 COMPREHEN METABOLIC PANEL: CPT

## 2020-06-03 PROCEDURE — 84443 ASSAY THYROID STIM HORMONE: CPT

## 2020-06-12 ENCOUNTER — OFFICE VISIT (OUTPATIENT)
Dept: FAMILY MEDICINE CLINIC | Facility: CLINIC | Age: 40
End: 2020-06-12
Payer: COMMERCIAL

## 2020-06-12 VITALS
BODY MASS INDEX: 26.16 KG/M2 | TEMPERATURE: 97.4 F | HEART RATE: 84 BPM | DIASTOLIC BLOOD PRESSURE: 72 MMHG | HEIGHT: 68 IN | SYSTOLIC BLOOD PRESSURE: 104 MMHG | WEIGHT: 172.6 LBS | OXYGEN SATURATION: 97 %

## 2020-06-12 DIAGNOSIS — J45.909 UNCOMPLICATED ASTHMA, UNSPECIFIED ASTHMA SEVERITY, UNSPECIFIED WHETHER PERSISTENT: ICD-10-CM

## 2020-06-12 DIAGNOSIS — E03.9 ACQUIRED HYPOTHYROIDISM: Primary | ICD-10-CM

## 2020-06-12 PROCEDURE — 99213 OFFICE O/P EST LOW 20 MIN: CPT | Performed by: INTERNAL MEDICINE

## 2020-06-12 PROCEDURE — 1036F TOBACCO NON-USER: CPT | Performed by: INTERNAL MEDICINE

## 2020-06-12 PROCEDURE — 3008F BODY MASS INDEX DOCD: CPT | Performed by: INTERNAL MEDICINE

## 2020-06-12 RX ORDER — ALBUTEROL SULFATE 90 UG/1
2 AEROSOL, METERED RESPIRATORY (INHALATION) EVERY 4 HOURS PRN
Qty: 3 INHALER | Refills: 1 | Status: SHIPPED | OUTPATIENT
Start: 2020-06-12 | End: 2021-10-05 | Stop reason: SDUPTHER

## 2020-06-12 RX ORDER — LEVOTHYROXINE SODIUM 0.07 MG/1
75 TABLET ORAL DAILY
Qty: 90 TABLET | Refills: 1 | Status: SHIPPED | OUTPATIENT
Start: 2020-06-12 | End: 2021-01-08

## 2020-08-13 ENCOUNTER — OFFICE VISIT (OUTPATIENT)
Dept: FAMILY MEDICINE CLINIC | Facility: CLINIC | Age: 40
End: 2020-08-13
Payer: COMMERCIAL

## 2020-08-13 VITALS
OXYGEN SATURATION: 99 % | BODY MASS INDEX: 26.22 KG/M2 | TEMPERATURE: 97 F | WEIGHT: 173 LBS | SYSTOLIC BLOOD PRESSURE: 150 MMHG | HEART RATE: 86 BPM | DIASTOLIC BLOOD PRESSURE: 80 MMHG | RESPIRATION RATE: 16 BRPM | HEIGHT: 68 IN

## 2020-08-13 DIAGNOSIS — R03.0 ELEVATED BP WITHOUT DIAGNOSIS OF HYPERTENSION: ICD-10-CM

## 2020-08-13 DIAGNOSIS — F41.9 ANXIETY: Primary | ICD-10-CM

## 2020-08-13 PROCEDURE — 1036F TOBACCO NON-USER: CPT | Performed by: INTERNAL MEDICINE

## 2020-08-13 PROCEDURE — 99214 OFFICE O/P EST MOD 30 MIN: CPT | Performed by: INTERNAL MEDICINE

## 2020-08-13 PROCEDURE — 3008F BODY MASS INDEX DOCD: CPT | Performed by: INTERNAL MEDICINE

## 2020-08-13 RX ORDER — ALPRAZOLAM 0.25 MG/1
0.25 TABLET ORAL
Qty: 30 TABLET | Refills: 0 | Status: SHIPPED | OUTPATIENT
Start: 2020-08-13 | End: 2021-10-05

## 2020-08-13 NOTE — PROGRESS NOTES
Assessment/Plan:         Diagnoses and all orders for this visit:    Anxiety  -     sertraline (ZOLOFT) 50 mg tablet; Take 1 tablet (50 mg total) by mouth daily  -     ALPRAZolam (XANAX) 0 25 mg tablet; Take 1 tablet (0 25 mg total) by mouth daily at bedtime as needed for anxiety    Elevated BP without diagnosis of hypertension  Comments:  see if resolved with sri  Subjective:      Patient ID: Harlan Calderon is a 46 y o  female  Pt complains of anxiety  Her bp is elevated and she says she is not feeling herself  +h/a occasional   +does breathing exercise for feeling anxious  +scared +heart can pound or race  Denies sob   +tremulous  She feels watching the news is making her upset  She does not check her bp at home  The following portions of the patient's history were reviewed and updated as appropriate: She  has a past medical history of Bladder incontinence, Disease of thyroid gland, Mental disorder, and Pap smear abnormality of cervix with ASCUS favoring benign (10/29/2014)  She   Patient Active Problem List    Diagnosis Date Noted    Encounter for gynecological examination (general) (routine) without abnormal findings 01/22/2020    Decreased libido without sexual dysfunction 01/22/2020    Acquired hypothyroidism 10/27/2017    Strain of lumbar region 10/16/2015    Asthma 10/10/2014     She  has a past surgical history that includes Dilation and evacuation  Her family history includes Alcohol abuse in her father and mother; Anemia in her maternal aunt and paternal aunt; Arthritis in her maternal grandmother; Diabetes in her maternal grandfather; Hypertension in her maternal grandmother; Kidney disease in her maternal grandmother; Mental illness in her father and mother; Osteoporosis in her maternal grandmother; Other in her mother and sister; Substance Abuse in her father and mother; Varicose Veins in her paternal aunt  She  reports that she has quit smoking   She has never used smokeless tobacco  She reports current alcohol use  She reports that she does not use drugs  Current Outpatient Medications   Medication Sig Dispense Refill    albuterol (Proventil HFA) 90 mcg/act inhaler Inhale 2 puffs every 4 (four) hours as needed for wheezing 3 Inhaler 1    Cetirizine HCl (ZYRTEC ALLERGY) 10 MG CAPS Take 1 capsule by mouth daily      levothyroxine 75 mcg tablet Take 1 tablet (75 mcg total) by mouth daily 90 tablet 1    Prenatal Vit-Fe Fumarate-FA (PRENATAL VITAMIN PO) Take by mouth      ALPRAZolam (XANAX) 0 25 mg tablet Take 1 tablet (0 25 mg total) by mouth daily at bedtime as needed for anxiety 30 tablet 0    sertraline (ZOLOFT) 50 mg tablet Take 1 tablet (50 mg total) by mouth daily 30 tablet 1     No current facility-administered medications for this visit  Current Outpatient Medications on File Prior to Visit   Medication Sig    albuterol (Proventil HFA) 90 mcg/act inhaler Inhale 2 puffs every 4 (four) hours as needed for wheezing    Cetirizine HCl (ZYRTEC ALLERGY) 10 MG CAPS Take 1 capsule by mouth daily    levothyroxine 75 mcg tablet Take 1 tablet (75 mcg total) by mouth daily    Prenatal Vit-Fe Fumarate-FA (PRENATAL VITAMIN PO) Take by mouth     No current facility-administered medications on file prior to visit  She is allergic to lac bovis and pollen extract       Review of Systems   Constitutional: Negative  Negative for chills and fever  HENT: Negative  Respiratory: Negative  Cardiovascular: Negative  Psychiatric/Behavioral: Positive for dysphoric mood  Negative for suicidal ideas  The patient is nervous/anxious  Objective:      /80   Pulse 86   Temp (!) 97 °F (36 1 °C)   Resp 16   Ht 5' 7 5" (1 715 m)   Wt 78 5 kg (173 lb)   LMP 07/31/2020   SpO2 99%   BMI 26 70 kg/m²          Physical Exam  Constitutional:       General: She is not in acute distress  Appearance: Normal appearance     HENT:      Head: Normocephalic and atraumatic  Neck:      Musculoskeletal: Neck supple  Cardiovascular:      Rate and Rhythm: Normal rate and regular rhythm  Pulmonary:      Effort: Pulmonary effort is normal       Breath sounds: Normal breath sounds  Neurological:      Mental Status: She is alert

## 2020-09-04 DIAGNOSIS — F41.9 ANXIETY: ICD-10-CM

## 2020-09-16 ENCOUNTER — OFFICE VISIT (OUTPATIENT)
Dept: FAMILY MEDICINE CLINIC | Facility: CLINIC | Age: 40
End: 2020-09-16
Payer: COMMERCIAL

## 2020-09-16 VITALS
HEART RATE: 74 BPM | HEIGHT: 68 IN | BODY MASS INDEX: 26.22 KG/M2 | DIASTOLIC BLOOD PRESSURE: 76 MMHG | SYSTOLIC BLOOD PRESSURE: 122 MMHG | TEMPERATURE: 97.4 F | RESPIRATION RATE: 16 BRPM | WEIGHT: 173 LBS | OXYGEN SATURATION: 99 %

## 2020-09-16 DIAGNOSIS — F41.9 ANXIETY: Primary | ICD-10-CM

## 2020-09-16 PROCEDURE — 3725F SCREEN DEPRESSION PERFORMED: CPT | Performed by: INTERNAL MEDICINE

## 2020-09-16 PROCEDURE — 99213 OFFICE O/P EST LOW 20 MIN: CPT | Performed by: INTERNAL MEDICINE

## 2020-09-16 PROCEDURE — 1036F TOBACCO NON-USER: CPT | Performed by: INTERNAL MEDICINE

## 2020-09-16 RX ORDER — HYDROXYZINE HYDROCHLORIDE 25 MG/1
25 TABLET, FILM COATED ORAL EVERY 6 HOURS PRN
Qty: 30 TABLET | Refills: 1 | Status: SHIPPED | OUTPATIENT
Start: 2020-09-16 | End: 2022-04-05

## 2020-09-16 NOTE — PROGRESS NOTES
only used xanax x 2  She stopped taking zoloft felt in a fog  Assessment/Plan:         Diagnoses and all orders for this visit:    Anxiety  Comments:  prn atarax  discussed to not take to drive  Orders:  -     hydrOXYzine HCL (ATARAX) 25 mg tablet; Take 1 tablet (25 mg total) by mouth every 6 (six) hours as needed for itching or anxiety  -     Ambulatory referral to Psychology; Future          Subjective:      Patient ID: Kristy Patel is a 46 y o  female  Only used xanax x 2 pills  She stopped zoloft because she felt foggy on it  She felt a little tired  She had one mild panic episode and tried prn zoloft  Discussed the zoloft is not designed for prn use  She only wants prn med  Will try atarax  The following portions of the patient's history were reviewed and updated as appropriate: She  has a past medical history of Bladder incontinence, Disease of thyroid gland, Mental disorder, and Pap smear abnormality of cervix with ASCUS favoring benign (10/29/2014)  She   Patient Active Problem List    Diagnosis Date Noted    Encounter for gynecological examination (general) (routine) without abnormal findings 01/22/2020    Decreased libido without sexual dysfunction 01/22/2020    Acquired hypothyroidism 10/27/2017    Strain of lumbar region 10/16/2015    Asthma 10/10/2014     She  has a past surgical history that includes Dilation and evacuation  Her family history includes Alcohol abuse in her father and mother; Anemia in her maternal aunt and paternal aunt; Arthritis in her maternal grandmother; Diabetes in her maternal grandfather; Hypertension in her maternal grandmother; Kidney disease in her maternal grandmother; Mental illness in her father and mother; Osteoporosis in her maternal grandmother; Other in her mother and sister; Substance Abuse in her father and mother; Varicose Veins in her paternal aunt  She  reports that she has quit smoking   She has never used smokeless tobacco  She reports current alcohol use  She reports that she does not use drugs  Current Outpatient Medications   Medication Sig Dispense Refill    albuterol (Proventil HFA) 90 mcg/act inhaler Inhale 2 puffs every 4 (four) hours as needed for wheezing 3 Inhaler 1    ALPRAZolam (XANAX) 0 25 mg tablet Take 1 tablet (0 25 mg total) by mouth daily at bedtime as needed for anxiety 30 tablet 0    Cetirizine HCl (ZYRTEC ALLERGY) 10 MG CAPS Take 1 capsule by mouth daily      levothyroxine 75 mcg tablet Take 1 tablet (75 mcg total) by mouth daily 90 tablet 1    Prenatal Vit-Fe Fumarate-FA (PRENATAL VITAMIN PO) Take by mouth      sertraline (ZOLOFT) 50 mg tablet TAKE 1 TABLET BY MOUTH EVERY DAY 30 tablet 1    hydrOXYzine HCL (ATARAX) 25 mg tablet Take 1 tablet (25 mg total) by mouth every 6 (six) hours as needed for itching or anxiety 30 tablet 1     No current facility-administered medications for this visit  Current Outpatient Medications on File Prior to Visit   Medication Sig    albuterol (Proventil HFA) 90 mcg/act inhaler Inhale 2 puffs every 4 (four) hours as needed for wheezing    ALPRAZolam (XANAX) 0 25 mg tablet Take 1 tablet (0 25 mg total) by mouth daily at bedtime as needed for anxiety    Cetirizine HCl (ZYRTEC ALLERGY) 10 MG CAPS Take 1 capsule by mouth daily    levothyroxine 75 mcg tablet Take 1 tablet (75 mcg total) by mouth daily    Prenatal Vit-Fe Fumarate-FA (PRENATAL VITAMIN PO) Take by mouth    sertraline (ZOLOFT) 50 mg tablet TAKE 1 TABLET BY MOUTH EVERY DAY     No current facility-administered medications on file prior to visit  She is allergic to lac bovis and pollen extract       Review of Systems   Constitutional: Negative  HENT: Negative  Respiratory: Negative  Cardiovascular: Negative  Psychiatric/Behavioral: Positive for sleep disturbance  The patient is nervous/anxious            Objective:      /76 (BP Location: Left arm, Patient Position: Sitting, Cuff Size: Standard)   Pulse 74   Temp (!) 97 4 °F (36 3 °C) (Temporal)   Resp 16   Ht 5' 7 5" (1 715 m)   Wt 78 5 kg (173 lb)   LMP 08/23/2020 (Exact Date)   SpO2 99%   BMI 26 70 kg/m²          Physical Exam  Constitutional:       Appearance: Normal appearance  HENT:      Head: Normocephalic and atraumatic  Right Ear: Tympanic membrane and ear canal normal       Left Ear: Tympanic membrane and ear canal normal    Cardiovascular:      Rate and Rhythm: Normal rate and regular rhythm  Pulmonary:      Effort: Pulmonary effort is normal       Breath sounds: Normal breath sounds  Neurological:      Mental Status: She is alert

## 2021-01-08 ENCOUNTER — TELEPHONE (OUTPATIENT)
Dept: FAMILY MEDICINE CLINIC | Facility: CLINIC | Age: 41
End: 2021-01-08

## 2021-01-08 DIAGNOSIS — E03.9 ACQUIRED HYPOTHYROIDISM: ICD-10-CM

## 2021-01-08 RX ORDER — LEVOTHYROXINE SODIUM 0.07 MG/1
TABLET ORAL
Qty: 90 TABLET | Refills: 1 | Status: SHIPPED | OUTPATIENT
Start: 2021-01-08 | End: 2021-09-16

## 2021-01-20 ENCOUNTER — OFFICE VISIT (OUTPATIENT)
Dept: FAMILY MEDICINE CLINIC | Facility: CLINIC | Age: 41
End: 2021-01-20
Payer: COMMERCIAL

## 2021-01-20 VITALS
OXYGEN SATURATION: 98 % | DIASTOLIC BLOOD PRESSURE: 68 MMHG | BODY MASS INDEX: 27.62 KG/M2 | SYSTOLIC BLOOD PRESSURE: 110 MMHG | RESPIRATION RATE: 16 BRPM | WEIGHT: 176 LBS | HEIGHT: 67 IN | TEMPERATURE: 98.3 F | HEART RATE: 96 BPM

## 2021-01-20 DIAGNOSIS — B35.4 TINEA CORPORIS: ICD-10-CM

## 2021-01-20 DIAGNOSIS — Z00.00 ANNUAL PHYSICAL EXAM: Primary | ICD-10-CM

## 2021-01-20 DIAGNOSIS — L30.9 ECZEMA, UNSPECIFIED TYPE: ICD-10-CM

## 2021-01-20 DIAGNOSIS — E03.9 ACQUIRED HYPOTHYROIDISM: ICD-10-CM

## 2021-01-20 PROCEDURE — 99395 PREV VISIT EST AGE 18-39: CPT | Performed by: INTERNAL MEDICINE

## 2021-01-20 PROCEDURE — 1036F TOBACCO NON-USER: CPT | Performed by: INTERNAL MEDICINE

## 2021-01-20 PROCEDURE — 3725F SCREEN DEPRESSION PERFORMED: CPT | Performed by: INTERNAL MEDICINE

## 2021-01-20 PROCEDURE — 99213 OFFICE O/P EST LOW 20 MIN: CPT | Performed by: INTERNAL MEDICINE

## 2021-01-20 PROCEDURE — 3008F BODY MASS INDEX DOCD: CPT | Performed by: INTERNAL MEDICINE

## 2021-01-20 RX ORDER — CLOTRIMAZOLE AND BETAMETHASONE DIPROPIONATE 10; .64 MG/G; MG/G
CREAM TOPICAL 2 TIMES DAILY
Qty: 30 G | Refills: 0 | Status: SHIPPED | OUTPATIENT
Start: 2021-01-20 | End: 2021-10-05

## 2021-01-20 NOTE — PATIENT INSTRUCTIONS

## 2021-01-20 NOTE — PROGRESS NOTES
[address]    NAME: Edgar Wolfe  AGE: 45 y o  SEX: female  : 1980     DATE: 2021     Assessment and Plan:     Problem List Items Addressed This Visit        Endocrine    Acquired hypothyroidism    Relevant Orders    TSH, 3rd generation with Free T4 reflex      Other Visit Diagnoses     Annual physical exam    -  Primary    Tinea corporis        Relevant Medications    clotrimazole-betamethasone (LOTRISONE) 1-0 05 % cream    Eczema, unspecified type        Relevant Medications    clotrimazole-betamethasone (LOTRISONE) 1-0 05 % cream          Immunizations and preventive care screenings were discussed with patient today  Appropriate education was printed on patient's after visit summary  Counseling:  · Dental Health: discussed importance of regular tooth brushing, flossing, and dental visits  BMI Counseling: Body mass index is 27 57 kg/m²  The BMI is above normal  Nutrition recommendations include decreasing portion sizes and limiting drinks that contain sugar  Exercise recommendations include exercising 3-5 times per week  No pharmacotherapy was ordered  Patient referred to PCP due to patient being overweight  Return if symptoms worsen or fail to improve  Chief Complaint:     Chief Complaint   Patient presents with    Annual Exam    Rash      History of Present Illness:     Adult Annual Physical   Patient here for a comprehensive physical exam  The patient reports no problems  Diet and Physical Activity  · Diet/Nutrition: well balanced diet  · Exercise: 5-7 times a week on average        Depression Screening  PHQ-9 Depression Screening    PHQ-9:   Frequency of the following problems over the past two weeks:      Little interest or pleasure in doing things: 0 - not at all  Feeling down, depressed, or hopeless: 0 - not at all  PHQ-2 Score: 0       General Health  · Sleep: gets 7-8 hours of sleep on average  · Hearing: normal - bilateral   · Vision: no vision problems  · Dental: no dental visits for >1 year  /GYN Health  · Last menstrual period: 21  · Contraceptive method: none  · History of STDs?: no      Review of Systems:     Review of Systems   Constitutional: Negative  HENT: Negative  Respiratory: Negative  Cardiovascular: Negative  Skin: Positive for rash        Past Medical History:     Past Medical History:   Diagnosis Date    Bladder incontinence     last assessed: 2014    Disease of thyroid gland     Mental disorder     Pap smear abnormality of cervix with ASCUS favoring benign 10/29/2014    HPV neg      Past Surgical History:     Past Surgical History:   Procedure Laterality Date    DILATION AND EVACUATION      surgically induced  @ 7 wks      Social History:        Social History     Socioeconomic History    Marital status: /Civil Union     Spouse name: None    Number of children: 1    Years of education: None    Highest education level: None   Occupational History    None   Social Needs    Financial resource strain: None    Food insecurity     Worry: None     Inability: None    Transportation needs     Medical: None     Non-medical: None   Tobacco Use    Smoking status: Former Smoker    Smokeless tobacco: Never Used   Substance and Sexual Activity    Alcohol use: Yes     Comment: being a social drinker    Drug use: No    Sexual activity: Yes     Partners: Male     Birth control/protection: None   Lifestyle    Physical activity     Days per week: None     Minutes per session: None    Stress: None   Relationships    Social connections     Talks on phone: None     Gets together: None     Attends Spiritism service: None     Active member of club or organization: None     Attends meetings of clubs or organizations: None     Relationship status: None    Intimate partner violence     Fear of current or ex partner: None     Emotionally abused: None     Physically abused: None     Forced sexual activity: None   Other Topics Concern    None   Social History Narrative    Always uses seatbelt    Exercising regularly    [address]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    Lives in mobile home    Lives with spouse: children    No advance directives    Primary language is English    Zoroastrian    Supportive and safe      Family History:     Family History   Problem Relation Age of Onset    Alcohol abuse Mother     Mental illness Mother         mental disorder    Substance Abuse Mother     Other Mother         urinary incontinence    Alcohol abuse Father     Mental illness Father         mental disorder    Substance Abuse Father     Other Sister         urinary incontinence    Arthritis Maternal Grandmother     Hypertension Maternal Grandmother     Osteoporosis Maternal Grandmother     Kidney disease Maternal Grandmother         renal disease    Diabetes Maternal Grandfather     Anemia Maternal Aunt     Anemia Paternal Aunt     Varicose Veins Paternal Aunt         of lower extremities      Current Medications:     Current Outpatient Medications   Medication Sig Dispense Refill    albuterol (Proventil HFA) 90 mcg/act inhaler Inhale 2 puffs every 4 (four) hours as needed for wheezing 3 Inhaler 1    ALPRAZolam (XANAX) 0 25 mg tablet Take 1 tablet (0 25 mg total) by mouth daily at bedtime as needed for anxiety 30 tablet 0    Cetirizine HCl (ZYRTEC ALLERGY) 10 MG CAPS Take 1 capsule by mouth daily      hydrOXYzine HCL (ATARAX) 25 mg tablet Take 1 tablet (25 mg total) by mouth every 6 (six) hours as needed for itching or anxiety 30 tablet 1    levothyroxine 75 mcg tablet TAKE 1 TABLET BY MOUTH EVERY DAY 90 tablet 1    Prenatal Vit-Fe Fumarate-FA (PRENATAL VITAMIN PO) Take by mouth      sertraline (ZOLOFT) 50 mg tablet TAKE 1 TABLET BY MOUTH EVERY DAY 30 tablet 1    clotrimazole-betamethasone (LOTRISONE) 1-0 05 % cream Apply topically 2 (two) times a day 30 g 0     No current facility-administered medications for this visit  Allergies: Allergies   Allergen Reactions    Lac Bovis GI Intolerance    Pollen Extract       Physical Exam:     /68 (BP Location: Right arm, Patient Position: Sitting, Cuff Size: Large)   Pulse 96   Temp 98 3 °F (36 8 °C) (Temporal)   Resp 16   Ht 5' 7" (1 702 m)   Wt 79 8 kg (176 lb)   SpO2 98%   BMI 27 57 kg/m²     Physical Exam  HENT:      Head: Normocephalic and atraumatic  Right Ear: Tympanic membrane, ear canal and external ear normal       Left Ear: Tympanic membrane, ear canal and external ear normal    Neck:      Musculoskeletal: Neck supple  Cardiovascular:      Rate and Rhythm: Normal rate and regular rhythm  Pulmonary:      Breath sounds: Normal breath sounds  Abdominal:      General: Bowel sounds are normal  There is no distension  Palpations: Abdomen is soft  Tenderness: There is no abdominal tenderness  Lymphadenopathy:      Cervical: No cervical adenopathy  Neurological:      Mental Status: She is alert            DO Danny Schmidt

## 2021-01-20 NOTE — PROGRESS NOTES
BMI Counseling: Body mass index is 27 57 kg/m²  The BMI is above normal  Nutrition recommendations include decreasing portion sizes and limiting drinks that contain sugar  Exercise recommendations include exercising 3-5 times per week  No pharmacotherapy was ordered  Patient referred to PCP due to patient being overweight  Assessment/Plan:         Diagnoses and all orders for this visit:    Annual physical exam    Tinea corporis  Comments:  lotrisone  Orders:  -     clotrimazole-betamethasone (LOTRISONE) 1-0 05 % cream; Apply topically 2 (two) times a day    Eczema, unspecified type  -     clotrimazole-betamethasone (LOTRISONE) 1-0 05 % cream; Apply topically 2 (two) times a day    Acquired hypothyroidism  Comments:  due for lab  Orders:  -     TSH, 3rd generation with Free T4 reflex; Future          Subjective:      Patient ID: Lilliana Ni is a 45 y o  female  Pt complains of rt elbow blisters and erythema mild rash  Also had itchy red spot rt knee  Will rx lotrisone  If no resolution to derm  The following portions of the patient's history were reviewed and updated as appropriate: She  has a past medical history of Bladder incontinence, Disease of thyroid gland, Mental disorder, and Pap smear abnormality of cervix with ASCUS favoring benign (10/29/2014)  She   Patient Active Problem List    Diagnosis Date Noted    Encounter for gynecological examination (general) (routine) without abnormal findings 01/22/2020    Decreased libido without sexual dysfunction 01/22/2020    Acquired hypothyroidism 10/27/2017    Strain of lumbar region 10/16/2015    Asthma 10/10/2014     She  has a past surgical history that includes Dilation and evacuation  Her family history includes Alcohol abuse in her father and mother; Anemia in her maternal aunt and paternal aunt;  Arthritis in her maternal grandmother; Diabetes in her maternal grandfather; Hypertension in her maternal grandmother; Kidney disease in her maternal grandmother; Mental illness in her father and mother; Osteoporosis in her maternal grandmother; Other in her mother and sister; Substance Abuse in her father and mother; Varicose Veins in her paternal aunt  She  reports that she has quit smoking  She has never used smokeless tobacco  She reports current alcohol use  She reports that she does not use drugs  Current Outpatient Medications   Medication Sig Dispense Refill    albuterol (Proventil HFA) 90 mcg/act inhaler Inhale 2 puffs every 4 (four) hours as needed for wheezing 3 Inhaler 1    ALPRAZolam (XANAX) 0 25 mg tablet Take 1 tablet (0 25 mg total) by mouth daily at bedtime as needed for anxiety 30 tablet 0    Cetirizine HCl (ZYRTEC ALLERGY) 10 MG CAPS Take 1 capsule by mouth daily      hydrOXYzine HCL (ATARAX) 25 mg tablet Take 1 tablet (25 mg total) by mouth every 6 (six) hours as needed for itching or anxiety 30 tablet 1    levothyroxine 75 mcg tablet TAKE 1 TABLET BY MOUTH EVERY DAY 90 tablet 1    Prenatal Vit-Fe Fumarate-FA (PRENATAL VITAMIN PO) Take by mouth      sertraline (ZOLOFT) 50 mg tablet TAKE 1 TABLET BY MOUTH EVERY DAY 30 tablet 1    clotrimazole-betamethasone (LOTRISONE) 1-0 05 % cream Apply topically 2 (two) times a day 30 g 0     No current facility-administered medications for this visit        Current Outpatient Medications on File Prior to Visit   Medication Sig    albuterol (Proventil HFA) 90 mcg/act inhaler Inhale 2 puffs every 4 (four) hours as needed for wheezing    ALPRAZolam (XANAX) 0 25 mg tablet Take 1 tablet (0 25 mg total) by mouth daily at bedtime as needed for anxiety    Cetirizine HCl (ZYRTEC ALLERGY) 10 MG CAPS Take 1 capsule by mouth daily    hydrOXYzine HCL (ATARAX) 25 mg tablet Take 1 tablet (25 mg total) by mouth every 6 (six) hours as needed for itching or anxiety    levothyroxine 75 mcg tablet TAKE 1 TABLET BY MOUTH EVERY DAY    Prenatal Vit-Fe Fumarate-FA (PRENATAL VITAMIN PO) Take by mouth    sertraline (ZOLOFT) 50 mg tablet TAKE 1 TABLET BY MOUTH EVERY DAY     No current facility-administered medications on file prior to visit  She is allergic to lac bovis and pollen extract       Review of Systems   Constitutional: Negative  HENT: Negative  Respiratory: Negative  Cardiovascular: Negative  Skin: Positive for rash  Objective:      /68 (BP Location: Right arm, Patient Position: Sitting, Cuff Size: Large)   Pulse 96   Temp 98 3 °F (36 8 °C) (Temporal)   Resp 16   Ht 5' 7" (1 702 m)   Wt 79 8 kg (176 lb)   SpO2 98%   BMI 27 57 kg/m²          Physical Exam  Constitutional:       Appearance: Normal appearance  HENT:      Head: Normocephalic and atraumatic  Right Ear: Tympanic membrane and ear canal normal       Left Ear: Tympanic membrane and ear canal normal    Neck:      Musculoskeletal: Neck supple  Cardiovascular:      Rate and Rhythm: Normal rate and regular rhythm  Pulmonary:      Effort: Pulmonary effort is normal       Breath sounds: Normal breath sounds  Skin:     Comments: Tinea corpis elbow/ ? Patch of eczyma rt knee     Neurological:      Mental Status: She is alert

## 2021-02-11 ENCOUNTER — ANNUAL EXAM (OUTPATIENT)
Dept: OBGYN CLINIC | Facility: MEDICAL CENTER | Age: 41
End: 2021-02-11
Payer: COMMERCIAL

## 2021-02-11 VITALS — BODY MASS INDEX: 27.1 KG/M2 | SYSTOLIC BLOOD PRESSURE: 118 MMHG | WEIGHT: 173 LBS | DIASTOLIC BLOOD PRESSURE: 70 MMHG

## 2021-02-11 DIAGNOSIS — F41.9 ANXIETY: ICD-10-CM

## 2021-02-11 DIAGNOSIS — Z01.419 ENCOUNTER FOR GYNECOLOGICAL EXAMINATION (GENERAL) (ROUTINE) WITHOUT ABNORMAL FINDINGS: Primary | ICD-10-CM

## 2021-02-11 PROBLEM — R68.82 DECREASED LIBIDO WITHOUT SEXUAL DYSFUNCTION: Status: RESOLVED | Noted: 2020-01-22 | Resolved: 2021-02-11

## 2021-02-11 PROCEDURE — 1036F TOBACCO NON-USER: CPT | Performed by: NURSE PRACTITIONER

## 2021-02-11 PROCEDURE — 99395 PREV VISIT EST AGE 18-39: CPT | Performed by: NURSE PRACTITIONER

## 2021-02-11 NOTE — ASSESSMENT & PLAN NOTE
C/o recent anxiety and panic attacks  H/o anxiety in the past  She reports good support from spouse  Seeing a therapist, which has been helping  Meditation has reduced symptoms as well  She does have atarax for PRN use and I advised talking with PCP about the benefits of a daily SSRI - she will consider  Also advised of the benefits of reintroducing exercise

## 2021-02-11 NOTE — PROGRESS NOTES
Assessment/Plan:    Encounter for gynecological examination (general) (routine) without abnormal findings  Normal findings on routine gyn exam  Advised monthly SBE, annual CBE and baseline screening mammo at age 37  Reviewed ASCCP guidelines and recommended pap with cotesting q3 yrs for this low risk patient; this was noted to be up to date  STI testing was offered and the patient declines; she reports low risk  The patient declines contraceptive counseling and is open to preg if this occurs; she is on daily multivit  Diet/activity recommendations:  Encouraged daily Ca++ and vitamin D intake as well as daily weight bearing exercise for promotion of bone health  RTO in one year or sooner PRN  Anxiety  C/o recent anxiety and panic attacks  H/o anxiety in the past  She reports good support from spouse  Seeing a therapist, which has been helping  Meditation has reduced symptoms as well  She does have atarax for PRN use and I advised talking with PCP about the benefits of a daily SSRI - she will consider  Also advised of the benefits of reintroducing exercise  Diagnoses and all orders for this visit:    Encounter for gynecological examination (general) (routine) without abnormal findings    Anxiety          Subjective:      Patient ID: Edgar Wolfe is a 45 y o  female  This patient presents for routine annual gyn exam   Medically stable  Anxiety has been an issue for about 5 mos  She has had several panic attacks  She attributes this in part to 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, pandemic concerns  Seeing a therapist, meditating often  She was exercising 5d/week before the panic attacks began - doesn't like the feeling of her heart pounding now and hasn't restarted exercising  Spouse is supportive  She denies acute gyn complaints  Menses are regular and light to mod  She denies pelvic pain, breast concerns, abnormal discharge, bowel/bladder dysfunction, depression/anxiety   and monogamous   She denies STI concerns  Not using contraception, rarely SA  Libido remains decreased  Spouse is supportive  They are trying to spend more time alone together but it is difficult    Daughter, Tej Wang, is in    Donato Cardenas is a stay at home mother       The following portions of the patient's history were reviewed and updated as appropriate: allergies, current medications, past family history, past medical history, past social history, past surgical history and problem list     Review of Systems   Constitutional: Negative  Respiratory: Negative  Cardiovascular: Negative  Gastrointestinal: Negative  Genitourinary: Negative  Musculoskeletal: Negative  Skin: Negative  Neurological: Negative  Psychiatric/Behavioral: Negative for self-injury and suicidal ideas  The patient is nervous/anxious  Objective:      /70   Wt 78 5 kg (173 lb)   LMP 01/29/2021   BMI 27 10 kg/m²          Physical Exam  Constitutional:       Appearance: She is well-developed  HENT:      Head: Normocephalic and atraumatic  Eyes:      Pupils: Pupils are equal, round, and reactive to light  Neck:      Musculoskeletal: Normal range of motion and neck supple  Thyroid: No thyromegaly  Cardiovascular:      Rate and Rhythm: Normal rate and regular rhythm  Heart sounds: Normal heart sounds  Pulmonary:      Effort: Pulmonary effort is normal  No respiratory distress  Breath sounds: Normal breath sounds  No wheezing or rales  Chest:      Chest wall: No mass, deformity or tenderness  Breasts: Breasts are symmetrical          Right: No inverted nipple, mass, nipple discharge, skin change or tenderness  Left: No inverted nipple, mass, nipple discharge, skin change or tenderness  Abdominal:      General: There is no distension  Palpations: Abdomen is soft  There is no hepatomegaly, splenomegaly or mass  Tenderness: There is no abdominal tenderness   There is no guarding or rebound  Genitourinary:     Labia:         Right: No rash, tenderness, lesion or injury  Left: No rash, tenderness, lesion or injury  Vagina: Normal  No foreign body  No vaginal discharge, erythema, tenderness or bleeding  Cervix: No cervical motion tenderness, discharge or friability  Uterus: Normal        Adnexa:         Right: No mass, tenderness or fullness  Left: No mass, tenderness or fullness  Rectum: Normal    Musculoskeletal: Normal range of motion  Lymphadenopathy:      Cervical: No cervical adenopathy  Skin:     General: Skin is warm and dry  Findings: No rash  Nails: There is no clubbing  Neurological:      Mental Status: She is alert and oriented to person, place, and time  Cranial Nerves: No cranial nerve deficit  Psychiatric:         Speech: Speech normal          Behavior: Behavior normal          Thought Content:  Thought content normal          Judgment: Judgment normal

## 2021-02-11 NOTE — ASSESSMENT & PLAN NOTE
Normal findings on routine gyn exam  Advised monthly SBE, annual CBE and baseline screening mammo at age 37  Reviewed ASCCP guidelines and recommended pap with cotesting q3 yrs for this low risk patient; this was noted to be up to date  STI testing was offered and the patient declines; she reports low risk  The patient declines contraceptive counseling and is open to preg if this occurs; she is on daily multivit  Diet/activity recommendations:  Encouraged daily Ca++ and vitamin D intake as well as daily weight bearing exercise for promotion of bone health  RTO in one year or sooner PRN

## 2021-03-10 DIAGNOSIS — Z23 ENCOUNTER FOR IMMUNIZATION: ICD-10-CM

## 2021-03-19 ENCOUNTER — IMMUNIZATIONS (OUTPATIENT)
Dept: FAMILY MEDICINE CLINIC | Facility: HOSPITAL | Age: 41
End: 2021-03-19

## 2021-03-19 DIAGNOSIS — Z23 ENCOUNTER FOR IMMUNIZATION: Primary | ICD-10-CM

## 2021-03-19 PROCEDURE — 0001A SARS-COV-2 / COVID-19 MRNA VACCINE (PFIZER-BIONTECH) 30 MCG: CPT

## 2021-03-19 PROCEDURE — 91300 SARS-COV-2 / COVID-19 MRNA VACCINE (PFIZER-BIONTECH) 30 MCG: CPT

## 2021-04-09 ENCOUNTER — IMMUNIZATIONS (OUTPATIENT)
Dept: FAMILY MEDICINE CLINIC | Facility: HOSPITAL | Age: 41
End: 2021-04-09

## 2021-04-09 DIAGNOSIS — Z23 ENCOUNTER FOR IMMUNIZATION: Primary | ICD-10-CM

## 2021-04-09 PROCEDURE — 0002A SARS-COV-2 / COVID-19 MRNA VACCINE (PFIZER-BIONTECH) 30 MCG: CPT

## 2021-04-09 PROCEDURE — 91300 SARS-COV-2 / COVID-19 MRNA VACCINE (PFIZER-BIONTECH) 30 MCG: CPT

## 2021-09-30 ENCOUNTER — OFFICE VISIT (OUTPATIENT)
Dept: DERMATOLOGY | Facility: CLINIC | Age: 41
End: 2021-09-30
Payer: COMMERCIAL

## 2021-09-30 VITALS — WEIGHT: 180 LBS | HEIGHT: 67 IN | BODY MASS INDEX: 28.25 KG/M2 | TEMPERATURE: 96.6 F

## 2021-09-30 DIAGNOSIS — L92.0 GRANULOMA ANNULARE: Primary | ICD-10-CM

## 2021-09-30 PROCEDURE — 99203 OFFICE O/P NEW LOW 30 MIN: CPT | Performed by: DERMATOLOGY

## 2021-09-30 RX ORDER — TRIAMCINOLONE ACETONIDE 1 MG/G
CREAM TOPICAL 2 TIMES DAILY
Qty: 80 G | Refills: 2 | Status: SHIPPED | OUTPATIENT
Start: 2021-09-30 | End: 2022-04-05 | Stop reason: SDUPTHER

## 2021-09-30 RX ORDER — TRIAMCINOLONE ACETONIDE 1 MG/G
CREAM TOPICAL 2 TIMES DAILY
Qty: 80 G | Refills: 2 | Status: SHIPPED | OUTPATIENT
Start: 2021-09-30 | End: 2021-09-30

## 2021-10-05 ENCOUNTER — OFFICE VISIT (OUTPATIENT)
Dept: FAMILY MEDICINE CLINIC | Facility: CLINIC | Age: 41
End: 2021-10-05
Payer: COMMERCIAL

## 2021-10-05 VITALS
SYSTOLIC BLOOD PRESSURE: 118 MMHG | OXYGEN SATURATION: 98 % | TEMPERATURE: 97.6 F | WEIGHT: 181 LBS | HEIGHT: 67 IN | HEART RATE: 78 BPM | BODY MASS INDEX: 28.41 KG/M2 | DIASTOLIC BLOOD PRESSURE: 72 MMHG

## 2021-10-05 DIAGNOSIS — L65.9 HAIR THINNING: ICD-10-CM

## 2021-10-05 DIAGNOSIS — E03.9 ACQUIRED HYPOTHYROIDISM: ICD-10-CM

## 2021-10-05 DIAGNOSIS — Z13.220 SCREENING CHOLESTEROL LEVEL: ICD-10-CM

## 2021-10-05 DIAGNOSIS — F41.9 ANXIETY: Primary | ICD-10-CM

## 2021-10-05 DIAGNOSIS — Z13.0 SCREENING FOR DEFICIENCY ANEMIA: ICD-10-CM

## 2021-10-05 DIAGNOSIS — J45.909 UNCOMPLICATED ASTHMA, UNSPECIFIED ASTHMA SEVERITY, UNSPECIFIED WHETHER PERSISTENT: ICD-10-CM

## 2021-10-05 PROCEDURE — 3008F BODY MASS INDEX DOCD: CPT | Performed by: INTERNAL MEDICINE

## 2021-10-05 PROCEDURE — 1036F TOBACCO NON-USER: CPT | Performed by: INTERNAL MEDICINE

## 2021-10-05 PROCEDURE — 99214 OFFICE O/P EST MOD 30 MIN: CPT | Performed by: INTERNAL MEDICINE

## 2021-10-05 RX ORDER — ALBUTEROL SULFATE 90 UG/1
2 AEROSOL, METERED RESPIRATORY (INHALATION) EVERY 4 HOURS PRN
Qty: 18 G | Refills: 2 | Status: SHIPPED | OUTPATIENT
Start: 2021-10-05

## 2021-10-12 ENCOUNTER — APPOINTMENT (OUTPATIENT)
Dept: LAB | Facility: MEDICAL CENTER | Age: 41
End: 2021-10-12
Payer: COMMERCIAL

## 2021-10-12 DIAGNOSIS — Z13.220 SCREENING CHOLESTEROL LEVEL: ICD-10-CM

## 2021-10-12 DIAGNOSIS — E03.9 ACQUIRED HYPOTHYROIDISM: ICD-10-CM

## 2021-10-12 DIAGNOSIS — Z13.0 SCREENING FOR DEFICIENCY ANEMIA: ICD-10-CM

## 2021-10-12 DIAGNOSIS — L65.9 HAIR THINNING: ICD-10-CM

## 2021-10-12 LAB
ALBUMIN SERPL BCP-MCNC: 4 G/DL (ref 3.5–5)
ALP SERPL-CCNC: 54 U/L (ref 46–116)
ALT SERPL W P-5'-P-CCNC: 18 U/L (ref 12–78)
ANION GAP SERPL CALCULATED.3IONS-SCNC: 5 MMOL/L (ref 4–13)
AST SERPL W P-5'-P-CCNC: 12 U/L (ref 5–45)
BILIRUB SERPL-MCNC: 0.7 MG/DL (ref 0.2–1)
BUN SERPL-MCNC: 10 MG/DL (ref 5–25)
CALCIUM SERPL-MCNC: 8.9 MG/DL (ref 8.3–10.1)
CHLORIDE SERPL-SCNC: 105 MMOL/L (ref 100–108)
CHOLEST SERPL-MCNC: 154 MG/DL (ref 50–200)
CO2 SERPL-SCNC: 25 MMOL/L (ref 21–32)
CREAT SERPL-MCNC: 0.76 MG/DL (ref 0.6–1.3)
ERYTHROCYTE [DISTWIDTH] IN BLOOD BY AUTOMATED COUNT: 12.1 % (ref 11.6–15.1)
GFR SERPL CREATININE-BSD FRML MDRD: 99 ML/MIN/1.73SQ M
GLUCOSE P FAST SERPL-MCNC: 84 MG/DL (ref 65–99)
HCT VFR BLD AUTO: 37.9 % (ref 34.8–46.1)
HDLC SERPL-MCNC: 56 MG/DL
HGB BLD-MCNC: 12.3 G/DL (ref 11.5–15.4)
LDLC SERPL CALC-MCNC: 88 MG/DL (ref 0–100)
MCH RBC QN AUTO: 30.1 PG (ref 26.8–34.3)
MCHC RBC AUTO-ENTMCNC: 32.5 G/DL (ref 31.4–37.4)
MCV RBC AUTO: 93 FL (ref 82–98)
NONHDLC SERPL-MCNC: 98 MG/DL
PLATELET # BLD AUTO: 285 THOUSANDS/UL (ref 149–390)
PMV BLD AUTO: 10.4 FL (ref 8.9–12.7)
POTASSIUM SERPL-SCNC: 4 MMOL/L (ref 3.5–5.3)
PROT SERPL-MCNC: 7.5 G/DL (ref 6.4–8.2)
RBC # BLD AUTO: 4.09 MILLION/UL (ref 3.81–5.12)
SODIUM SERPL-SCNC: 135 MMOL/L (ref 136–145)
T4 FREE SERPL-MCNC: 1.31 NG/DL (ref 0.76–1.46)
TRIGL SERPL-MCNC: 49 MG/DL
TSH SERPL DL<=0.05 MIU/L-ACNC: 4.16 UIU/ML (ref 0.36–3.74)
WBC # BLD AUTO: 4.86 THOUSAND/UL (ref 4.31–10.16)

## 2021-10-12 PROCEDURE — 84402 ASSAY OF FREE TESTOSTERONE: CPT

## 2021-10-12 PROCEDURE — 85027 COMPLETE CBC AUTOMATED: CPT

## 2021-10-12 PROCEDURE — 82626 DEHYDROEPIANDROSTERONE: CPT

## 2021-10-12 PROCEDURE — 80053 COMPREHEN METABOLIC PANEL: CPT

## 2021-10-12 PROCEDURE — 86225 DNA ANTIBODY NATIVE: CPT

## 2021-10-12 PROCEDURE — 36415 COLL VENOUS BLD VENIPUNCTURE: CPT

## 2021-10-12 PROCEDURE — 80061 LIPID PANEL: CPT

## 2021-10-12 PROCEDURE — 84403 ASSAY OF TOTAL TESTOSTERONE: CPT

## 2021-10-12 PROCEDURE — 84439 ASSAY OF FREE THYROXINE: CPT

## 2021-10-12 PROCEDURE — 84443 ASSAY THYROID STIM HORMONE: CPT

## 2021-10-13 LAB
TESTOST FREE SERPL-MCNC: 1.5 PG/ML (ref 0–4.2)
TESTOST SERPL-MCNC: 18 NG/DL (ref 8–60)

## 2021-10-15 LAB — MISCELLANEOUS LAB TEST RESULT: NORMAL

## 2021-10-19 LAB — DHEA SERPL-MCNC: 144 NG/DL (ref 31–701)

## 2021-11-05 DIAGNOSIS — E03.9 ACQUIRED HYPOTHYROIDISM: ICD-10-CM

## 2021-11-05 RX ORDER — LEVOTHYROXINE SODIUM 0.1 MG/1
TABLET ORAL
Qty: 30 TABLET | Refills: 1 | Status: SHIPPED | OUTPATIENT
Start: 2021-11-05 | End: 2021-11-30

## 2021-11-30 DIAGNOSIS — E03.9 ACQUIRED HYPOTHYROIDISM: ICD-10-CM

## 2021-11-30 RX ORDER — LEVOTHYROXINE SODIUM 0.1 MG/1
TABLET ORAL
Qty: 30 TABLET | Refills: 1 | Status: SHIPPED | OUTPATIENT
Start: 2021-11-30 | End: 2021-12-28

## 2021-12-28 DIAGNOSIS — E03.9 ACQUIRED HYPOTHYROIDISM: ICD-10-CM

## 2021-12-28 RX ORDER — LEVOTHYROXINE SODIUM 0.1 MG/1
TABLET ORAL
Qty: 30 TABLET | Refills: 1 | Status: SHIPPED | OUTPATIENT
Start: 2021-12-28 | End: 2021-12-29 | Stop reason: SDUPTHER

## 2022-04-05 ENCOUNTER — OFFICE VISIT (OUTPATIENT)
Dept: FAMILY MEDICINE CLINIC | Facility: CLINIC | Age: 42
End: 2022-04-05
Payer: COMMERCIAL

## 2022-04-05 ENCOUNTER — APPOINTMENT (OUTPATIENT)
Dept: LAB | Facility: MEDICAL CENTER | Age: 42
End: 2022-04-05
Payer: COMMERCIAL

## 2022-04-05 VITALS
HEIGHT: 67 IN | HEART RATE: 80 BPM | RESPIRATION RATE: 16 BRPM | SYSTOLIC BLOOD PRESSURE: 122 MMHG | BODY MASS INDEX: 29.66 KG/M2 | TEMPERATURE: 97.3 F | DIASTOLIC BLOOD PRESSURE: 84 MMHG | WEIGHT: 189 LBS | OXYGEN SATURATION: 98 %

## 2022-04-05 DIAGNOSIS — E03.9 ACQUIRED HYPOTHYROIDISM: ICD-10-CM

## 2022-04-05 DIAGNOSIS — E03.9 ACQUIRED HYPOTHYROIDISM: Primary | ICD-10-CM

## 2022-04-05 DIAGNOSIS — R07.9 CHEST PAIN, UNSPECIFIED TYPE: ICD-10-CM

## 2022-04-05 DIAGNOSIS — R00.0 TACHYCARDIA: ICD-10-CM

## 2022-04-05 DIAGNOSIS — L92.0 GRANULOMA ANNULARE: ICD-10-CM

## 2022-04-05 DIAGNOSIS — Z12.31 ENCOUNTER FOR SCREENING MAMMOGRAM FOR MALIGNANT NEOPLASM OF BREAST: ICD-10-CM

## 2022-04-05 LAB — TSH SERPL DL<=0.05 MIU/L-ACNC: 3.44 UIU/ML (ref 0.45–4.5)

## 2022-04-05 PROCEDURE — 84443 ASSAY THYROID STIM HORMONE: CPT

## 2022-04-05 PROCEDURE — 36415 COLL VENOUS BLD VENIPUNCTURE: CPT

## 2022-04-05 PROCEDURE — 3008F BODY MASS INDEX DOCD: CPT | Performed by: INTERNAL MEDICINE

## 2022-04-05 PROCEDURE — 99214 OFFICE O/P EST MOD 30 MIN: CPT | Performed by: INTERNAL MEDICINE

## 2022-04-05 PROCEDURE — 1036F TOBACCO NON-USER: CPT | Performed by: INTERNAL MEDICINE

## 2022-04-05 PROCEDURE — 3725F SCREEN DEPRESSION PERFORMED: CPT | Performed by: INTERNAL MEDICINE

## 2022-04-05 RX ORDER — LEVOTHYROXINE SODIUM 0.1 MG/1
100 TABLET ORAL DAILY
Qty: 90 TABLET | Refills: 1 | Status: SHIPPED | OUTPATIENT
Start: 2022-04-05

## 2022-04-05 RX ORDER — TRIAMCINOLONE ACETONIDE 1 MG/G
CREAM TOPICAL 2 TIMES DAILY
Qty: 80 G | Refills: 2 | Status: SHIPPED | OUTPATIENT
Start: 2022-04-05

## 2022-04-05 NOTE — PROGRESS NOTES
BMI Counseling: Body mass index is 29 6 kg/m²  The BMI is above normal  Nutrition recommendations include decreasing portion sizes and limiting drinks that contain sugar  Exercise recommendations include exercising 3-5 times per week  No pharmacotherapy was ordered  Patient referred to PCP  Rationale for BMI follow-up plan is due to patient being overweight or obese  Depression Screening and Follow-up Plan: Patient was screened for depression during today's encounter  They screened negative with a PHQ-2 score of 0  Assessment/Plan:         Diagnoses and all orders for this visit:    Acquired hypothyroidism  Comments:  renew med and check lab q 6 months  Orders:  -     levothyroxine 100 mcg tablet; Take 1 tablet (100 mcg total) by mouth daily    Encounter for screening mammogram for malignant neoplasm of breast  -     Mammo screening bilateral w 3d & cad; Future    Granuloma annulare  Comments:  renew med  Orders:  -     triamcinolone (KENALOG) 0 1 % cream; Apply topically 2 (two) times a day    Chest pain, unspecified type  Comments:  r/o card with sister age 39 recent [de-identified] of cad  Orders:  -     Echo complete w/ contrast if indicated; Future  -     Stress test only, exercise; Future    Tachycardia  -     Echo complete w/ contrast if indicated; Future  -     Holter monitor; Future          Subjective:      Patient ID: Yossi Castañeda is a 37 y o  female  Pt due for thyroid lab  Will do today  She gets left lateral side wall of chest pain  She relates her sister just diagnosed with cad at age 39  The following portions of the patient's history were reviewed and updated as appropriate: She  has a past medical history of Bladder incontinence, Disease of thyroid gland, Mental disorder, and Pap smear abnormality of cervix with ASCUS favoring benign (10/29/2014)    She   Patient Active Problem List    Diagnosis Date Noted    Anxiety 02/11/2021    Encounter for gynecological examination (general) (routine) without abnormal findings 01/22/2020    Acquired hypothyroidism 10/27/2017    Strain of lumbar region 10/16/2015    Asthma 10/10/2014     She  has a past surgical history that includes Dilation and evacuation  Her family history includes Alcohol abuse in her father and mother; Anemia in her maternal aunt and paternal aunt; Arthritis in her maternal grandmother; Diabetes in her maternal grandfather; Hypertension in her maternal grandmother; Kidney disease in her maternal grandmother; Mental illness in her father and mother; Osteoporosis in her maternal grandmother; Other in her mother and sister; Substance Abuse in her father and mother; Varicose Veins in her paternal aunt  She  reports that she has quit smoking  She has a 6 00 pack-year smoking history  She has never used smokeless tobacco  She reports previous alcohol use  She reports that she does not use drugs  Current Outpatient Medications   Medication Sig Dispense Refill    albuterol (Proventil HFA) 90 mcg/act inhaler Inhale 2 puffs every 4 (four) hours as needed for wheezing 18 g 2    Cetirizine HCl (ZYRTEC ALLERGY) 10 MG CAPS Take 1 capsule by mouth daily      levothyroxine 100 mcg tablet Take 1 tablet (100 mcg total) by mouth daily 90 tablet 1    triamcinolone (KENALOG) 0 1 % cream Apply topically 2 (two) times a day 80 g 2     No current facility-administered medications for this visit       Current Outpatient Medications on File Prior to Visit   Medication Sig    albuterol (Proventil HFA) 90 mcg/act inhaler Inhale 2 puffs every 4 (four) hours as needed for wheezing    Cetirizine HCl (ZYRTEC ALLERGY) 10 MG CAPS Take 1 capsule by mouth daily    [DISCONTINUED] levothyroxine 100 mcg tablet Take 1 tablet (100 mcg total) by mouth daily    [DISCONTINUED] triamcinolone (KENALOG) 0 1 % cream Apply topically 2 (two) times a day    [DISCONTINUED] hydrOXYzine HCL (ATARAX) 25 mg tablet Take 1 tablet (25 mg total) by mouth every 6 (six) hours as needed for itching or anxiety (Patient not taking: Reported on 4/5/2022 )    [DISCONTINUED] Prenatal Vit-Fe Fumarate-FA (PRENATAL VITAMIN PO) Take by mouth  (Patient not taking: Reported on 4/5/2022 )     No current facility-administered medications on file prior to visit  She is allergic to lac bovis and pollen extract       Review of Systems   Constitutional: Negative  HENT: Negative  Respiratory: Negative  Cardiovascular: Positive for chest pain  Gastrointestinal: Negative  Objective:      /84 (BP Location: Right arm, Patient Position: Sitting, Cuff Size: Large)   Pulse 80   Temp (!) 97 3 °F (36 3 °C) (Temporal)   Resp 16   Ht 5' 7" (1 702 m)   Wt 85 7 kg (189 lb)   SpO2 98%   BMI 29 60 kg/m²          Physical Exam  Constitutional:       Appearance: Normal appearance  HENT:      Head: Normocephalic and atraumatic  Right Ear: Tympanic membrane and ear canal normal       Left Ear: Tympanic membrane and ear canal normal    Cardiovascular:      Rate and Rhythm: Normal rate and regular rhythm  Pulmonary:      Effort: Pulmonary effort is normal       Breath sounds: Normal breath sounds  Musculoskeletal:      Cervical back: Neck supple  Lymphadenopathy:      Cervical: No cervical adenopathy  Neurological:      Mental Status: She is alert

## 2022-05-27 ENCOUNTER — ANNUAL EXAM (OUTPATIENT)
Dept: OBGYN CLINIC | Facility: MEDICAL CENTER | Age: 42
End: 2022-05-27
Payer: COMMERCIAL

## 2022-05-27 VITALS
SYSTOLIC BLOOD PRESSURE: 140 MMHG | WEIGHT: 194.4 LBS | HEIGHT: 67 IN | DIASTOLIC BLOOD PRESSURE: 86 MMHG | BODY MASS INDEX: 30.51 KG/M2

## 2022-05-27 DIAGNOSIS — Z01.419 ENCOUNTER FOR GYNECOLOGICAL EXAMINATION (GENERAL) (ROUTINE) WITHOUT ABNORMAL FINDINGS: Primary | ICD-10-CM

## 2022-05-27 PROCEDURE — S0612 ANNUAL GYNECOLOGICAL EXAMINA: HCPCS | Performed by: OBSTETRICS & GYNECOLOGY

## 2022-05-27 PROCEDURE — 3008F BODY MASS INDEX DOCD: CPT | Performed by: INTERNAL MEDICINE

## 2022-05-27 RX ORDER — DIPHENOXYLATE HYDROCHLORIDE AND ATROPINE SULFATE 2.5; .025 MG/1; MG/1
1 TABLET ORAL DAILY
COMMUNITY

## 2022-05-27 NOTE — PROGRESS NOTES
Mary Schulz  1980      CC:  Yearly exam    S:  37 y o  female here for yearly exam  LMP 5/10/22  Her cycles are regular every 24d, lasting 5, heavy x 1 day, mild to no cramping  Her mom is Gunjan Herndon! Daughter Lina Borden has her birthday party is this weekend  Has been exercising more and unsure if related but more nipple sensitivity  She denies vaginal discharge, itching, pelvic pain  She has no urinary concerns, does not have incontinence  No bowel concerns  No breast concerns  Sexual activity: She is rarely sexually active without pain, bleeding or dryness  Struggles with low libido  Her  is very supportive, and this is not causing a strain on her relationship  Finds it hard to have time alone with him to really pursue this more  Contraception: She uses nothing for contraception  Still has small part of her that would consider another pregnancy  Had trouble conceiving -  has "slow swimmers"  Discussed that if she wished to pursue this more would encourage ESTELA  Last Pap: 11/1/18 - Normal Cytology, Neg HPV  Last Mammo: Scheduled! We reviewed ASCCP guidelines for Pap testing today       Family hx of breast cancer: no  Family hx of ovarian cancer: no  Family hx of colon cancer: no      Current Outpatient Medications:     albuterol (Proventil HFA) 90 mcg/act inhaler, Inhale 2 puffs every 4 (four) hours as needed for wheezing, Disp: 18 g, Rfl: 2    Cetirizine HCl 10 MG CAPS, Take 1 capsule by mouth daily, Disp: , Rfl:     levothyroxine 100 mcg tablet, Take 1 tablet (100 mcg total) by mouth daily, Disp: 90 tablet, Rfl: 1    multivitamin (THERAGRAN) TABS, Take 1 tablet by mouth daily, Disp: , Rfl:     triamcinolone (KENALOG) 0 1 % cream, Apply topically 2 (two) times a day, Disp: 80 g, Rfl: 2  Patient Active Problem List   Diagnosis    Acquired hypothyroidism    Asthma    Strain of lumbar region    Encounter for gynecological examination (general) (routine) without abnormal findings    Anxiety     Past Medical History:   Diagnosis Date    Bladder incontinence     last assessed: 4/25/2014    Disease of thyroid gland     Mental disorder     Pap smear abnormality of cervix with ASCUS favoring benign 10/29/2014    HPV neg     Family History   Problem Relation Age of Onset    Alcohol abuse Mother     Mental illness Mother         mental disorder    Substance Abuse Mother     Other Mother         urinary incontinence    Alcohol abuse Father     Mental illness Father         mental disorder    Substance Abuse Father     Other Sister         urinary incontinence    Arthritis Maternal Grandmother     Hypertension Maternal Grandmother     Osteoporosis Maternal Grandmother     Kidney disease Maternal Grandmother         renal disease    Diabetes Maternal Grandfather     Anemia Maternal Aunt     Anemia Paternal Aunt     Varicose Veins Paternal Aunt         of lower extremities          Review of Systems   Respiratory: Negative  Cardiovascular: Negative  Gastrointestinal: Negative for constipation and diarrhea  O:  Blood pressure 140/86, height 5' 6 5" (1 689 m), weight 88 2 kg (194 lb 6 4 oz), last menstrual period 05/10/2022  Patient appears well and is not in distress  Breasts are symmetrical without mass, tenderness, nipple discharge, skin changes or adenopathy  Abdomen is soft and nontender without masses  External genitals are normal without lesions or rashes  Urethral meatus and urethra are normal  Bladder is normal to palpation  Vagina is normal without discharge or bleeding  Cervix is normal without discharge or lesion  Uterus is normal, mobile, nontender without palpable mass  Adnexa are normal, nontender, without palpable mass  A:  Yearly exam      P:   Pap & HPV up to date, due 2023   Mammo is scheduled      RTO one year for yearly exam or sooner as needed

## 2022-05-27 NOTE — PROGRESS NOTES
Patient's Lab: [facility]    Last Yearly: 2/11/21  Last Pap: 11/1/18  Results: -/-  Last Mammo Date: 1st one scheduled  BC: None  LMP: 5/10/22  Menses flow: 24d/lasting 5d/1d heavy than moderate flow/mild cramping if any  S/P HPV Series: No  S/P Covid Shot: Completed  Sexually Active: Yes and no (not often due to low libido)  STI Testing: Declined      Family hx of breast cancer: no  Family hx of ovarian cancer: no  Family hx of colon cancer: no    Q's/Concerns: Breast sensitivity

## 2022-07-20 ENCOUNTER — HOSPITAL ENCOUNTER (OUTPATIENT)
Dept: RADIOLOGY | Facility: MEDICAL CENTER | Age: 42
Discharge: HOME | End: 2022-07-20
Payer: COMMERCIAL

## 2022-07-20 VITALS — WEIGHT: 194 LBS | BODY MASS INDEX: 30.45 KG/M2 | HEIGHT: 67 IN

## 2022-07-20 DIAGNOSIS — Z12.31 ENCOUNTER FOR SCREENING MAMMOGRAM FOR MALIGNANT NEOPLASM OF BREAST: ICD-10-CM

## 2022-07-20 PROCEDURE — 77063 BREAST TOMOSYNTHESIS BI: CPT

## 2022-07-20 PROCEDURE — 77067 SCR MAMMO BI INCL CAD: CPT

## 2022-09-30 DIAGNOSIS — E03.9 ACQUIRED HYPOTHYROIDISM: ICD-10-CM

## 2022-09-30 RX ORDER — LEVOTHYROXINE SODIUM 0.1 MG/1
TABLET ORAL
Qty: 90 TABLET | Refills: 0 | Status: SHIPPED | OUTPATIENT
Start: 2022-09-30

## 2022-10-05 ENCOUNTER — OFFICE VISIT (OUTPATIENT)
Dept: FAMILY MEDICINE CLINIC | Facility: CLINIC | Age: 42
End: 2022-10-05
Payer: COMMERCIAL

## 2022-10-05 VITALS
WEIGHT: 196 LBS | OXYGEN SATURATION: 99 % | HEART RATE: 71 BPM | TEMPERATURE: 97.6 F | SYSTOLIC BLOOD PRESSURE: 116 MMHG | DIASTOLIC BLOOD PRESSURE: 70 MMHG | RESPIRATION RATE: 16 BRPM | HEIGHT: 67 IN | BODY MASS INDEX: 30.76 KG/M2

## 2022-10-05 DIAGNOSIS — J45.909 UNCOMPLICATED ASTHMA, UNSPECIFIED ASTHMA SEVERITY, UNSPECIFIED WHETHER PERSISTENT: ICD-10-CM

## 2022-10-05 DIAGNOSIS — E03.9 ACQUIRED HYPOTHYROIDISM: Primary | ICD-10-CM

## 2022-10-05 PROCEDURE — 99213 OFFICE O/P EST LOW 20 MIN: CPT | Performed by: INTERNAL MEDICINE

## 2022-10-05 NOTE — PROGRESS NOTES
Depression Screening and Follow-up Plan: Patient was screened for depression during today's encounter  They screened negative with a PHQ-2 score of 0  Assessment/Plan:         Diagnoses and all orders for this visit:    Acquired hypothyroidism  -     TSH, 3rd generation with Free T4 reflex; Future    Uncomplicated asthma, unspecified asthma severity, unspecified whether persistent          Subjective:      Patient ID: Harlan Calderon is a 37 y o  female  Does not need rx  States recent uri and asthma flare x 24 hrs  It resolved but needs rx refilled  Due for lab      The following portions of the patient's history were reviewed and updated as appropriate: She  has a past medical history of Bladder incontinence, Disease of thyroid gland, Mental disorder, and Pap smear abnormality of cervix with ASCUS favoring benign (10/29/2014)  She   Patient Active Problem List    Diagnosis Date Noted    Anxiety 02/11/2021    Encounter for gynecological examination (general) (routine) without abnormal findings 01/22/2020    Acquired hypothyroidism 10/27/2017    Strain of lumbar region 10/16/2015    Asthma 10/10/2014     She  has a past surgical history that includes Dilation and evacuation  Her family history includes Alcohol abuse in her father and mother; Anemia in her maternal aunt and paternal aunt; Arthritis in her maternal grandmother; Diabetes in her maternal grandfather; Hypertension in her maternal grandmother; Kidney disease in her maternal grandmother; Mental illness in her father and mother; No Known Problems in her daughter, maternal aunt, maternal aunt, maternal uncle, maternal uncle, maternal uncle, paternal aunt, paternal grandfather, paternal grandmother, paternal uncle, paternal uncle, sister, sister, and sister; Osteoporosis in her maternal grandmother; Other in her mother and sister; Substance Abuse in her father and mother; Varicose Veins in her paternal aunt    She  reports that she has quit smoking  Her smoking use included cigarettes  She has a 6 00 pack-year smoking history  She has never used smokeless tobacco  She reports current alcohol use  She reports that she does not use drugs  Current Outpatient Medications   Medication Sig Dispense Refill    albuterol (Proventil HFA) 90 mcg/act inhaler Inhale 2 puffs every 4 (four) hours as needed for wheezing 18 g 2    Cetirizine HCl 10 MG CAPS Take 1 capsule by mouth daily      levothyroxine 100 mcg tablet TAKE 1 TABLET BY MOUTH EVERY DAY 90 tablet 0    multivitamin (THERAGRAN) TABS Take 1 tablet by mouth daily      triamcinolone (KENALOG) 0 1 % cream Apply topically 2 (two) times a day 80 g 2     No current facility-administered medications for this visit  Current Outpatient Medications on File Prior to Visit   Medication Sig    albuterol (Proventil HFA) 90 mcg/act inhaler Inhale 2 puffs every 4 (four) hours as needed for wheezing    Cetirizine HCl 10 MG CAPS Take 1 capsule by mouth daily    levothyroxine 100 mcg tablet TAKE 1 TABLET BY MOUTH EVERY DAY    multivitamin (THERAGRAN) TABS Take 1 tablet by mouth daily    triamcinolone (KENALOG) 0 1 % cream Apply topically 2 (two) times a day     No current facility-administered medications on file prior to visit  She is allergic to lac bovis and pollen extract       Review of Systems   Constitutional: Negative  HENT: Negative  Respiratory: Negative  Negative for shortness of breath and wheezing  Cardiovascular: Negative  Objective:      /70 (BP Location: Left arm, Patient Position: Sitting, Cuff Size: Large)   Pulse 71   Temp 97 6 °F (36 4 °C) (Temporal)   Resp 16   Ht 5' 6 5" (1 689 m)   Wt 88 9 kg (196 lb)   SpO2 99%   BMI 31 16 kg/m²          Physical Exam  Constitutional:       Appearance: She is obese  HENT:      Head: Normocephalic and atraumatic        Right Ear: Tympanic membrane and ear canal normal       Left Ear: Tympanic membrane and ear canal normal    Cardiovascular:      Rate and Rhythm: Normal rate and regular rhythm  Pulmonary:      Effort: Pulmonary effort is normal       Breath sounds: Normal breath sounds  Musculoskeletal:      Cervical back: Neck supple  Lymphadenopathy:      Cervical: No cervical adenopathy  Neurological:      Mental Status: She is alert

## 2022-12-27 DIAGNOSIS — E03.9 ACQUIRED HYPOTHYROIDISM: ICD-10-CM

## 2022-12-27 RX ORDER — LEVOTHYROXINE SODIUM 0.1 MG/1
TABLET ORAL
Qty: 90 TABLET | Refills: 0 | Status: SHIPPED | OUTPATIENT
Start: 2022-12-27

## 2023-01-23 ENCOUNTER — OFFICE VISIT (OUTPATIENT)
Dept: FAMILY MEDICINE CLINIC | Facility: CLINIC | Age: 43
End: 2023-01-23

## 2023-01-23 VITALS
BODY MASS INDEX: 30.17 KG/M2 | TEMPERATURE: 98.5 F | SYSTOLIC BLOOD PRESSURE: 140 MMHG | WEIGHT: 192.2 LBS | HEIGHT: 67 IN | OXYGEN SATURATION: 98 % | HEART RATE: 81 BPM | DIASTOLIC BLOOD PRESSURE: 86 MMHG

## 2023-01-23 DIAGNOSIS — F41.9 ANXIETY: Primary | ICD-10-CM

## 2023-01-23 DIAGNOSIS — G44.209 MUSCLE TENSION HEADACHE: ICD-10-CM

## 2023-01-23 NOTE — PROGRESS NOTES
Assessment/Plan:     Diagnoses and all orders for this visit:    Anxiety  Comments:  Patient declines medication therapy  She will continue with her therapist at this time    Muscle tension headache  Comments:  Over-the-counter Tylenol and warm moist heat  Subjective:      Patient ID: Tiana Jefferson is a 40 y o  female  Presents in the office with pain in the right and left side of her neck which started on Saturday  States she has no sore throat  Had a headache which has been dull back of their head which radiates forward to her forehead  No nasal congestion and a slight cough  Getting over-the-counter Aleve with relief  Patient states her last period was January 13  Then she has been having full-blown currents of anxiety with some panic attacks  States this happens at the start of the [place] pandemic  Since that time she has been in therapy  She became very upset and crying  Patient declined antianxiety medication  She will continue with her therapy        The following portions of the patient's history were reviewed and updated as appropriate:   She   Patient Active Problem List    Diagnosis Date Noted   • Anxiety 02/11/2021   • Encounter for gynecological examination (general) (routine) without abnormal findings 01/22/2020   • Acquired hypothyroidism 10/27/2017   • Strain of lumbar region 10/16/2015   • Asthma 10/10/2014     Current Outpatient Medications   Medication Sig Dispense Refill   • albuterol (Proventil HFA) 90 mcg/act inhaler Inhale 2 puffs every 4 (four) hours as needed for wheezing 18 g 2   • Cetirizine HCl 10 MG CAPS Take 1 capsule by mouth daily     • levothyroxine 100 mcg tablet TAKE 1 TABLET BY MOUTH EVERY DAY 90 tablet 0   • multivitamin (THERAGRAN) TABS Take 1 tablet by mouth daily     • triamcinolone (KENALOG) 0 1 % cream Apply topically 2 (two) times a day (Patient not taking: Reported on 1/23/2023) 80 g 2     No current facility-administered medications for this visit      She is allergic to lac bovis and pollen extract       Review of Systems   Constitutional: Negative for activity change, appetite change, chills, fatigue and fever  HENT: Negative for ear pain, postnasal drip, rhinorrhea, sinus pressure, sinus pain and sore throat  Respiratory: Negative for cough  Musculoskeletal: Positive for neck pain  Neurological: Positive for headaches  Psychiatric/Behavioral: The patient is nervous/anxious  Objective:        Physical Exam  Vitals and nursing note reviewed  Constitutional:       General: She is not in acute distress  Appearance: Normal appearance  She is well-developed  She is not diaphoretic  HENT:      Head: Normocephalic and atraumatic  Right Ear: Tympanic membrane, ear canal and external ear normal       Left Ear: Tympanic membrane, ear canal and external ear normal       Nose:      Right Sinus: No maxillary sinus tenderness or frontal sinus tenderness  Left Sinus: No maxillary sinus tenderness or frontal sinus tenderness  Mouth/Throat:      Pharynx: No oropharyngeal exudate or posterior oropharyngeal erythema  Eyes:      Conjunctiva/sclera: Conjunctivae normal       Pupils: Pupils are equal, round, and reactive to light  Neck:      Thyroid: No thyromegaly  Vascular: No carotid bruit  Comments: The spine is full range of motion  Does have some tenderness in the right and left sternocleidomastoid region  No masses  Cardiovascular:      Rate and Rhythm: Normal rate and regular rhythm  Heart sounds: No murmur heard  No friction rub  No gallop  Pulmonary:      Effort: Pulmonary effort is normal  No respiratory distress  Breath sounds: Normal breath sounds  No wheezing  Musculoskeletal:      Cervical back: Normal range of motion and neck supple  Lymphadenopathy:      Cervical: No cervical adenopathy  Skin:     General: Skin is warm and dry  Findings: No erythema or rash  Neurological:      General: No focal deficit present  Mental Status: She is alert and oriented to person, place, and time  Psychiatric:         Mood and Affect: Mood normal          Behavior: Behavior normal          Thought Content:  Thought content normal          Judgment: Judgment normal

## 2023-03-23 DIAGNOSIS — E03.9 ACQUIRED HYPOTHYROIDISM: ICD-10-CM

## 2023-03-23 RX ORDER — LEVOTHYROXINE SODIUM 0.1 MG/1
TABLET ORAL
Qty: 90 TABLET | Refills: 0 | Status: SHIPPED | OUTPATIENT
Start: 2023-03-23

## 2023-05-05 DIAGNOSIS — L65.9 HAIR LOSS: Primary | ICD-10-CM

## 2023-05-22 ENCOUNTER — CLINICAL SUPPORT (OUTPATIENT)
Dept: FAMILY MEDICINE CLINIC | Facility: CLINIC | Age: 43
End: 2023-05-22

## 2023-05-22 DIAGNOSIS — Z11.1 SCREENING FOR TUBERCULOSIS: Primary | ICD-10-CM

## 2023-06-20 DIAGNOSIS — E03.9 ACQUIRED HYPOTHYROIDISM: ICD-10-CM

## 2023-06-21 RX ORDER — LEVOTHYROXINE SODIUM 0.1 MG/1
TABLET ORAL
Qty: 90 TABLET | Refills: 0 | Status: SHIPPED | OUTPATIENT
Start: 2023-06-21

## 2023-06-30 ENCOUNTER — TELEPHONE (OUTPATIENT)
Dept: DERMATOLOGY | Facility: CLINIC | Age: 43
End: 2023-06-30

## 2023-06-30 ENCOUNTER — ANNUAL EXAM (OUTPATIENT)
Dept: OBGYN CLINIC | Facility: MEDICAL CENTER | Age: 43
End: 2023-06-30
Payer: COMMERCIAL

## 2023-06-30 VITALS
WEIGHT: 187.6 LBS | BODY MASS INDEX: 30.15 KG/M2 | HEIGHT: 66 IN | SYSTOLIC BLOOD PRESSURE: 130 MMHG | DIASTOLIC BLOOD PRESSURE: 72 MMHG

## 2023-06-30 DIAGNOSIS — N92.6 IRREGULAR MENSES: ICD-10-CM

## 2023-06-30 DIAGNOSIS — Z12.31 ENCOUNTER FOR SCREENING MAMMOGRAM FOR BREAST CANCER: ICD-10-CM

## 2023-06-30 DIAGNOSIS — R68.82 DECREASED LIBIDO: ICD-10-CM

## 2023-06-30 DIAGNOSIS — Z12.4 CERVICAL CANCER SCREENING: ICD-10-CM

## 2023-06-30 DIAGNOSIS — Z01.419 ENCOUNTER FOR GYNECOLOGICAL EXAMINATION (GENERAL) (ROUTINE) WITHOUT ABNORMAL FINDINGS: Primary | ICD-10-CM

## 2023-06-30 LAB — SL AMB POCT URINE HCG: NEGATIVE

## 2023-06-30 PROCEDURE — 81025 URINE PREGNANCY TEST: CPT | Performed by: CLINICAL NURSE SPECIALIST

## 2023-06-30 PROCEDURE — G0145 SCR C/V CYTO,THINLAYER,RESCR: HCPCS | Performed by: CLINICAL NURSE SPECIALIST

## 2023-06-30 PROCEDURE — 99396 PREV VISIT EST AGE 40-64: CPT | Performed by: CLINICAL NURSE SPECIALIST

## 2023-06-30 PROCEDURE — G0476 HPV COMBO ASSAY CA SCREEN: HCPCS | Performed by: CLINICAL NURSE SPECIALIST

## 2023-06-30 NOTE — ASSESSMENT & PLAN NOTE
Pt w/ decreased libido  Good relationship, denies depression  Discussed multifactorial nature of this disorder with women  Discussed possible meds including vyleesi and Addyi  Discussed create time for her and spouse- date night at least 1-2 times per month   Will call if interested in meds

## 2023-06-30 NOTE — TELEPHONE ENCOUNTER
Pt called to schedule ovs for hair thinning  She wants to discuss treatment options  Checked for available appts and advised pt that I was placing her on the wait list  Pt advised to call back to check for cancellations  Pre-Procedure Text: The treatment areas were cleaned and prepped in the usual fashion.

## 2023-06-30 NOTE — PROGRESS NOTES
Subjective:        Jason Fu is a 40 y o  female  Here for Gynecologic Exam (Yearly Exam/Patient missed her period in June  Felt like she was getting her period and had brown spotting for 1-2 days around June 19th /Mammo 7/20/22 BI-RADS 1 scheduled for 7/27/23/Pap 11/1/18 neg/neg (Pap today)/Not currently sexually active-low sex drive )    GYN HPI  Here for annual gyn exam  Menstrual cycle:  Menstrual patttern: Typically monthly and regular, lasting 5-6 days  however they have been getting lighter the past 2 cycles  Has light period in may and Spotting mid-june- but was just brown  Also concerned she may have endometriosis due to LUQ abd pain that seems cyclical  Occurs about 1 wk before menses and dissippates shortly after it starts  Present for at least 6 months  Pain is not debilitating  Did have check up with PCP  Vaginal c/o: denies  Urinary c/o: denies  Breast complaints:denies  She does do self breast Exams    Sexually active: yes,  and monogamous relationship  C/O decreased sex drive  Denies depression,  Minimal alcohol intake  Good spousal relationship    Contraception: no  She reports she feels safe at home  The following portions of the patient's history were reviewed and updated as appropriate: allergies, current medications, past family history, past medical history, past social history, past surgical history, and problem list     Hereditary Cancer Screening  Cancer-related family history is not on file  Substance Abuse Screening Completed  See hx and flowsheet  Screens Positive for none       HEALTH MAINTENANCE SCREENINGS:    History of abnormal pap: No, Last Papanicolaou test:  11/01/2018  History of abnormal mammogram: No, Last mammogram:  07/20/2022    IMMUNIZATIONS  Gardasil HPV vaccine was not completed    Review of Systems   Constitutional: Negative for appetite change, chills, fatigue, fever and unexpected weight change  HENT: Negative  Eyes: Negative  "  Respiratory: Negative for chest tightness and shortness of breath  Cardiovascular: Negative for chest pain and palpitations  Gastrointestinal: Negative for abdominal pain, constipation and vomiting  Endocrine: Negative for cold intolerance and heat intolerance  Genitourinary:        As per HPI   Musculoskeletal: Negative for back pain, joint swelling and neck pain  Skin: Negative for color change and rash  Neurological: Negative for dizziness, weakness and numbness  Hematological: Does not bruise/bleed easily  Psychiatric/Behavioral: Negative  Objective:  /72 (BP Location: Left arm, Patient Position: Sitting, Cuff Size: Standard)   Ht 5' 6 25\" (1 683 m)   Wt 85 1 kg (187 lb 9 6 oz)   LMP 05/20/2023 (Approximate)   BMI 30 05 kg/m²        Physical Exam  Constitutional:       General: She is not in acute distress  Appearance: Normal appearance  Genitourinary:      Vulva and rectum normal       No lesions in the vagina  Right Labia: No rash or lesions  Left Labia: No lesions or rash  No vaginal discharge, erythema, tenderness or bleeding  Right Adnexa: not tender and no mass present  Left Adnexa: not tender and no mass present  No cervical motion tenderness, discharge or friability  Uterus is not enlarged or tender  No urethral prolapse present  Pelvic exam was performed with patient in the lithotomy position  Breasts:     Breasts are symmetrical       Right: No inverted nipple, mass, nipple discharge, skin change or tenderness  Left: No inverted nipple, mass, nipple discharge, skin change or tenderness  HENT:      Head: Normocephalic and atraumatic  Cardiovascular:      Rate and Rhythm: Normal rate  Heart sounds: No murmur heard  Pulmonary:      Effort: Pulmonary effort is normal       Breath sounds: Normal breath sounds  Abdominal:      General: There is no distension  Palpations: Abdomen is soft        " Tenderness: There is no abdominal tenderness  Musculoskeletal:         General: Normal range of motion  Cervical back: Normal range of motion  Lymphadenopathy:      Cervical: No cervical adenopathy  Neurological:      Mental Status: She is alert and oriented to person, place, and time  Skin:     General: Skin is warm and dry  Psychiatric:         Mood and Affect: Mood normal          Behavior: Behavior normal    Vitals reviewed  Assessment/Plan:           Graham López- Primary  Annual GYN examination completed today  Risk prevention and anticipatory guidance provided including:  • Risk for hereditary cancers: Family history reviewed and patient does not qualify for referral for genetics consult/testing  Referral to genetics oncology offered as indicated  • Calcium and vitamin D supplementation  • Dietary and lifestyle recommendations based on her age and weight  body mass index is 30 05 kg/m²       • Tobacco and alcohol use, intervention ordered if applicable  Cervical cancer screening  Previous pap smears and ASCCP screening guidelines have been reviewed  Pap smear is indicated at this time  Contraception   declines    STI Screening  STI screening is declined  STI prevention discussed with use of condoms  Breast exam and breast cancer screening  Breast exam was done; , Reviewed recommendation for yearly screening mammogram (as per ACOG, ACS, and [address] [place] [address] Departments), however, also made her aware that there are other professional organizations that may recommend deferring mammograms until age 48 or screening every other year  CBE should still be done yearly, but may miss some cancers and alone is not as effective as breast imaging  Supplemental testing may also be indicated based on lifetime risk for breast cancer as done by [facility] imaging , She is up to date with screening;  Next due 7/2023    Problem List Items Addressed This Visit     Decreased libido     Pt w/ decreased libido  Good relationship, denies depression  Discussed multifactorial nature of this disorder with women  Discussed possible meds including vyleesi and Addyi  Discussed create time for her and spouse- date night at least 1-2 times per month   Will call if interested in meds         Encounter for gynecological examination (general) (routine) without abnormal findings - Primary    Relevant Orders    Liquid-based pap, screening   Other Visit Diagnoses     Cervical cancer screening        Encounter for screening mammogram for breast cancer        Irregular menses        Relevant Orders    Follicle stimulating hormone          Orders Placed This Encounter   Procedures   • Follicle stimulating hormone

## 2023-07-07 LAB
LAB AP GYN PRIMARY INTERPRETATION: NORMAL
Lab: NORMAL

## 2023-07-27 ENCOUNTER — OFFICE VISIT (OUTPATIENT)
Dept: DERMATOLOGY | Facility: CLINIC | Age: 43
End: 2023-07-27
Payer: COMMERCIAL

## 2023-07-27 VITALS — WEIGHT: 190 LBS | HEIGHT: 66 IN | BODY MASS INDEX: 30.53 KG/M2

## 2023-07-27 DIAGNOSIS — D22.5 MULTIPLE BENIGN MELANOCYTIC NEVI OF UPPER AND LOWER EXTREMITIES AND TRUNK: Primary | ICD-10-CM

## 2023-07-27 DIAGNOSIS — D22.70 MULTIPLE BENIGN MELANOCYTIC NEVI OF UPPER AND LOWER EXTREMITIES AND TRUNK: Primary | ICD-10-CM

## 2023-07-27 DIAGNOSIS — L64.9 ANDROGENETIC ALOPECIA: ICD-10-CM

## 2023-07-27 DIAGNOSIS — L92.0 GRANULOMA ANNULARE: ICD-10-CM

## 2023-07-27 DIAGNOSIS — L81.4 LENTIGO: ICD-10-CM

## 2023-07-27 DIAGNOSIS — D18.01 CHERRY ANGIOMA: ICD-10-CM

## 2023-07-27 DIAGNOSIS — D22.60 MULTIPLE BENIGN MELANOCYTIC NEVI OF UPPER AND LOWER EXTREMITIES AND TRUNK: Primary | ICD-10-CM

## 2023-07-27 DIAGNOSIS — L85.3 XEROSIS OF SKIN: ICD-10-CM

## 2023-07-27 DIAGNOSIS — L82.1 SEBORRHEIC KERATOSES: ICD-10-CM

## 2023-07-27 PROCEDURE — 99213 OFFICE O/P EST LOW 20 MIN: CPT | Performed by: DERMATOLOGY

## 2023-07-27 NOTE — PROGRESS NOTES
[facility] Note     Patient Name: Catrachito Horner  Encounter Date: 07/27/2023     Have you been cared for by a [facility]atologist in the last 3 years and, if so, which description applies to you? Yes. I have been here within the last 3 years, and my medical history has NOT changed since that time. I am FEMALE/of child-bearing potential.    REVIEW OF SYSTEMS:  Have you recently had or currently have any of the following? · No changes in my recent health. PAST MEDICAL HISTORY:  Have you personally ever had or currently have any of the following? If "YES," then please provide more detail. · No changes in my medical history. FAMILY HISTORY:  Any "first degree relatives" (parent, brother, sister, or child) with the following? • No changes in my family's known health. PATIENT EXPERIENCE:    • Do you want the Dermatologist to perform a COMPLETE skin exam today including a clinical examination under the "bra and underwear" areas? NO  • If necessary, do we have your permission to call and leave a detailed message on your Preferred Phone number that includes your specific medical information? Yes      Allergies   Allergen Reactions   • Milk (Cow) GI Intolerance   • Pollen Extract Sneezing      Current Outpatient Medications:   •  albuterol (Proventil HFA) 90 mcg/act inhaler, Inhale 2 puffs every 4 (four) hours as needed for wheezing, Disp: 18 g, Rfl: 2  •  Cetirizine HCl 10 MG CAPS, Take 1 capsule by mouth daily, Disp: , Rfl:   •  levothyroxine 100 mcg tablet, TAKE 1 TABLET BY MOUTH EVERY DAY, Disp: 90 tablet, Rfl: 0  •  multivitamin (THERAGRAN) TABS, Take 1 tablet by mouth daily, Disp: , Rfl:           • Whom besides the patient is providing clinical information about today's encounter?   o NO ADDITIONAL HISTORIAN (patient alone provided history)    Physical Exam and Assessment/Plan by Diagnosis:    1.  MELANOCYTIC NEVI ("Moles")    Physical Exam:  • Anatomic Location Affected: Mostly on sun-exposed areas of the trunk and extremities  • Morphological Description:  Scattered, 1-4mm round to ovoid, symmetrical-appearing, even bordered, skin colored to dark brown macules/papules, mostly in sun-exposed areas  • Pertinent Positives:  • Pertinent Negatives:    Assessment and Plan:  Based on a thorough discussion of this condition and the management approach to it (including a comprehensive discussion of the known risks, side effects and potential benefits of treatment), the patient (family) agrees to implement the following specific plan:  • When outside we recommend using a wide brim hat, sunglasses, long sleeve and pants, sunscreen with SPF 33+ with reapplication every 2 hours, or SPF specific clothing   • Benign, reassured  • Annual skin check     Melanocytic Nevi  Melanocytic nevi ("moles") are tan or brown, raised or flat areas of the skin which have an increased number of melanocytes. Melanocytes are the cells in our body which make pigment and account for skin color. Some moles are present at birth (I.e., "congenital nevi"), while others come up later in life (i.e., "acquired nevi"). The sun can stimulate the body to make more moles. Sunburns are not the only thing that triggers more moles. Chronic sun exposure can do it too. Clinically distinguishing a healthy mole from melanoma may be difficult, even for experienced dermatologists. The "ABCDE's" of moles have been suggested as a means of helping to alert a person to a suspicious mole and the possible increased risk of melanoma. The suggestions for raising alert are as follows:    Asymmetry: Healthy moles tend to be symmetric, while melanomas are often asymmetric. Asymmetry means if you draw a line through the mole, the two halves do not match in color, size, shape, or surface texture.  Asymmetry can be a result of rapid enlargement of a mole, the development of a raised area on a previously flat lesion, scaling, ulceration, bleeding or scabbing within the mole. Any mole that starts to demonstrate "asymmetry" should be examined promptly by a board certified dermatologist.     Border: Healthy moles tend to have discrete, even borders. The border of a melanoma often blends into the normal skin and does not sharply delineate the mole from normal skin. Any mole that starts to demonstrate "uneven borders" should be examined promptly by a board certified dermatologist.     Color: Healthy moles tend to be one color throughout. Melanomas tend to be made up of different colors ranging from dark black, blue, white, or red. Any mole that demonstrates a color change should be examined promptly by a board certified dermatologist.     Diameter: Healthy moles tend to be smaller than 0.6 cm in size; an exception are "congenital nevi" that can be larger. Melanomas tend to grow and can often be greater than 0.6 cm (1/4 of an inch, or the size of a pencil eraser). This is only a guideline, and many normal moles may be larger than 0.6 cm without being unhealthy. Any mole that starts to change in size (small to bigger or bigger to smaller) should be examined promptly by a board certified dermatologist.     Evolving: Healthy moles tend to "stay the same."  Melanomas may often show signs of change or evolution such as a change in size, shape, color, or elevation.   Any mole that starts to itch, bleed, crust, burn, hurt, or ulcerate or demonstrate a change or evolution should be examined promptly by a board certified dermatologist.        2. LENTIGO    Physical Exam:  • Anatomic Location Affected:  trunk, arms  • Morphological Description:  Light brown macules  • Pertinent Positives:  • Pertinent Negatives:    Assessment and Plan:  Based on a thorough discussion of this condition and the management approach to it (including a comprehensive discussion of the known risks, side effects and potential benefits of treatment), the patient (family) agrees to implement the following specific plan:  • When outside we recommend using a wide brim hat, sunglasses, long sleeve and pants, sunscreen with SPF 76+ with reapplication every 2 hours, or SPF specific clothing       What is a lentigo? A lentigo is a pigmented flat or slightly raised lesion with a clearly defined edge. Unlike an ephelis (freckle), it does not fade in the winter months. There are several kinds of lentigo. The name lentigo originally referred to its appearance resembling a small lentil. The plural of lentigo is lentigines, although “lentigos” is also in common use. Who gets lentigines? Lentigines can affect males and females of all ages and races. Solar lentigines are especially prevalent in fair skinned adults. Lentigines associated with syndromes are present at birth or arise during childhood. What causes lentigines? Common forms of lentigo are due to exposure to ultraviolet radiation:  • Sun damage including sunburn   • Indoor tanning   • Phototherapy, especially photochemotherapy (PUVA)    Ionizing radiation, eg radiation therapy, can also cause lentigines. Several familial syndromes associated with widespread lentigines originate from mutations in Maurisio-MAP kinase, mTOR signaling and PTEN pathways. What is the treatment for lentigines? Most lentigines are left alone. Attempts to lighten them may not be successful. The following approaches are used:  • SPF 50+ broad-spectrum sunscreen   • Hydroquinone bleaching cream   • Alpha hydroxy acids   • Vitamin C   • Retinoids   • Azelaic acid   • Chemical peels  Individual lesions can be permanently removed using:  • Cryotherapy   • Intense pulsed light   • Pigment lasers    How can lentigines be prevented? Lentigines associated with exposure ultraviolet radiation can be prevented by very careful sun protection. Clothing is more successful at preventing new lentigines than are sunscreens. What is the outlook for lentigines?   Lentigines usually persist. They may increase in number with age and sun exposure. Some in sun-protected sites may fade and disappear. 3. MENDEZ ANGIOMAS    Physical Exam:  • Anatomic Location Affected:  trunk  • Morphological Description:  Scattered cherry red, 1-4 mm papules. • Pertinent Positives:  • Pertinent Negatives:    Assessment and Plan:  Based on a thorough discussion of this condition and the management approach to it (including a comprehensive discussion of the known risks, side effects and potential benefits of treatment), the patient (family) agrees to implement the following specific plan:  • Monitor for changes  • Benign, reassured    Assessment and Plan:    Cherry angioma, also known as Cher Winchester spots, are benign vascular skin lesions. A "cherry angioma" is a firm red, blue or purple papule, 0.1-1 cm in diameter. When thrombosed, they can appear black in colour until evaluated with a dermatoscope when the red or purple colour is more easily seen. Cherry angioma may develop on any part of the body but most often appear on the scalp, face, lips and trunk. An angioma is due to proliferating endothelial cells; these are the cells that line the inside of a blood vessel. Angiomas can arise in early life or later in life; the most common type of angioma is a cherry angioma. Cherry angiomas are very common in males and females of any age or race. They are more noticeable in white skin than in skin of colour. They markedly increase in number from about the age of 36. There may be a family history of similar lesions. Eruptive cherry angiomas have been rarely reported to be associated with internal malignancy. The cause of angiomas is unknown. Genetic analysis of cherry angiomas has shown that they frequently carry specific somatic missense mutations in the GNAQ and GNA11 (Q209H) genes, which are involved in other vascular and melanocytic proliferations.       4. SEBORRHEIC KERATOSIS; NON-INFLAMED    Physical Exam:  • Anatomic Location Affected:  trunk  • Morphological Description:  Flat and raised, waxy, smooth to warty textured, yellow to brownish-grey to dark brown to blackish, discrete, "stuck-on" appearing papules. • Pertinent Positives:  • Pertinent Negatives:    Assessment and Plan:  Based on a thorough discussion of this condition and the management approach to it (including a comprehensive discussion of the known risks, side effects and potential benefits of treatment), the patient (family) agrees to implement the following specific plan:  • Monitor for changes  • Benign, reassured    Seborrheic Keratosis  A seborrheic keratosis is a harmless warty spot that appears during adult life as a common sign of skin aging. Seborrheic keratoses can arise on any area of skin, covered or uncovered, with the usual exception of the palms and soles. They do not arise from mucous membranes. Seborrheic keratoses can have highly variable appearance. Seborrheic keratoses are extremely common. It has been estimated that over 90% of adults over the age of 61 years have one or more of them. They occur in males and females of all races, typically beginning to erupt in the 35s or 45s. They are uncommon under the age of 21 years. The precise cause of seborrhoeic keratoses is not known. Seborrhoeic keratoses are considered degenerative in nature. As time goes by, seborrheic keratoses tend to become more numerous. Some people inherit a tendency to develop a very large number of them; some people may have hundreds of them. There is no easy way to remove multiple lesions on a single occasion. Unless a specific lesion is "inflamed" and is causing pain or stinging/burning or is bleeding, most insurance companies do not authorize treatment.     5. XEROSIS ("DRY SKIN")    Physical Exam:  • Anatomic Location Affected:  diffuse  • Morphological Description:  xerosis  • Pertinent Positives:  • Pertinent Negatives:    Assessment and Plan:  Based on a thorough discussion of this condition and the management approach to it (including a comprehensive discussion of the known risks, side effects and potential benefits of treatment), the patient (family) agrees to implement the following specific plan:  • Use moisturizer like Eucerin,Cerave or Aveeno Cream 3 times a day for the dry skin   •   •    •     Dry skin refers to skin that feels dry to touch. Dry skin has a dull surface with a rough, scaly quality. The skin is less pliable and cracked. When dryness is severe, the skin may become inflamed and fissured. Although any body site can be dry, dry skin tends to affect the shins more than any other site. Dry skin is lacking moisture in the outer horny cell layer (stratum corneum) and this results in cracks in the skin surface. Dry skin is also called xerosis, xeroderma or asteatosis (lack of fat). It can affect males and females of all ages. There is some racial variability in water and lipid content of the skin. • Dry skin that starts in early childhood may be one of about 20 types of ichthyosis (fish-scale skin). There is often a family history of dry skin. • Dry skin is commonly seen in people with atopic dermatitis. • Nearly everyone > 60 years has dry skin. Dry skin that begins later may be seen in people with certain diseases and conditions. • Postmenopausal women  • Hypothyroidism  • Chronic renal disease   • Malnutrition and weight loss   • Subclinical dermatitis   • Treatment with certain drugs such as oral retinoids, diuretics and epidermal growth factor receptor inhibitors      What is the treatment for dry skin? The mainstay of treatment of dry skin and ichthyosis is moisturisers/emollients. They should be applied liberally and often enough to:  • Reduce itch   • Improve the barrier function   • Prevent entry of irritants, bacteria   • Reduce transepidermal water loss.       How can dry skin be prevented? Eliminate aggravating factors:  • Reduce the frequency of bathing. • A humidifier in winter and air conditioner in summer   • Compare having a short shower with a prolonged soak in a bath. • Use lukewarm, not hot, water. • Replace standard soap with a substitute such as a synthetic detergent cleanser, water-miscible emollient, bath oil, anti-pruritic tar oil, colloidal oatmeal etc.   • Apply an emollient liberally and often, particularly shortly after bathing, and when itchy. The drier the skin, the thicker this should be, especially on the hands. What is the outlook for dry skin? A tendency to dry skin may persist life-long, or it may improve once contributing factors are controlled. 6. ANDROGENETIC ALOPECIA OR FEMALE/MALE PATTERN HAIR LOSS    Physical Exam:  • Anatomic Location Affected:  scalp  • Morphological Description:  thinning, increased variability  • Pertinent Positives:  • Pertinent Negatives:    Assessment and Plan:  Based on a thorough discussion of this condition and the management approach to it (including a comprehensive discussion of the known risks, side effects and potential benefits of treatment), the patient (family) agrees to implement the following specific plan:  • Discussed treatment options, such as:  o over the counter rogaine (minoxidil) 5% foam. Use one cap full a day on dry hair, part hair and apply directly to scalp. Do not do too close to bedtime. Need to do this daily. If you stop abruptly, you will lose hair. Can taper off if you don't like it.   o Could do laser therapy like laser caps or quintana (examples: capillus, laser hair max). Buy over the counter, online. o Cosmetic treatments like platelet rich plasma (PRP) do it monthly for 3 months.  Dr. Sallie Singer in our practice does this.  o Hair transplant surgery   o Hair fibers, put on topically so it looks more full (example topix)  o Oral pills: spironolactone (hormonal blood pressure medication, risks include decreased blood pressure, lightheadedness, abnormal menses, breast tenderness, need to be careful of potassium intake), finasteride/dutasteride (hormonal medication, not recommended if personal or family history of breast/ovarian cancers) and minoxidil oral pills (risk of cardiac side effects such as swelling and fluid around heart)  • Plan for topical minoxidil    7. GRANULOMA ANNULARE    Physical Exam:  • Anatomic Location Affected:  Left lower leg   • Morphological Description:  Annular pink plaque   • Pertinent Positives:  • Pertinent Negatives:    Assessment and Plan:  Based on a thorough discussion of this condition and the management approach to it (including a comprehensive discussion of the known risks, side effects and potential benefits of treatment), the patient (family) agrees to implement the following specific plan:  • Itchy but differs treatment for now     What is granuloma annulare? Granuloma annulare, also known as necrobiotic papulosis, is a non-contagious, benign skin condition that causes a rash. It is usually asymptomatic and can have a wide range of presentations on the skin. Most commonly, patients see a slightly raised patch, thickened, ring-shaped patch. These tend to be found on the hand, arm, foot, or leg, but can appear anywhere on the skin. It is more common in females than males. Interestingly, the perforating type of granuloma annulare most often affects people who live in [place]. What causes granuloma annulare? Most people who develop granuloma annulare are otherwise healthy. Children are most likely to get the localized, subcutaneous form, while older adults tend to develop generalized and perforating types. It is still unclear what exactly causes granuloma annulare, but there is evidence to suggest that it may follow skin injury, certain medications, and other diseases. The inflammation is mediated by tumor necrosis factor.  Those who have HIV, lymphoma, thyroid disease, or diabetes may be especially prone. What are the signs and symptoms of granuloma annulare? Granuloma annulare can occur at any site of the body and can be quite widespread. It may by asymptomatic, but often quite tender if the area affected is bumped. The ring-like rash can be easily confused with tinea corporis, which is a type of fungal infection on the body. In localized granuloma annulare, there is a raised ring-shaped patch on the skin. They are usually reddish in color, but can be pink, violet, or skin-colored. Bumps on the skin often develop prior, and join together to form the rash. Most common sites are the skin over joints, knuckles, backs of both hands, and on top of the foot. The center of each ring-like rash is often a little depression. Some people develop widespread bumps on their skin that grow together to form large patches in generalized granuloma annulare. These appear in many colors as widespread skin-colored, pinkish to slightly mauve-colored patches. Other types include:  - Subcutaneous granuloma annulare. When this type develops, you often see a roundish, firm, and usually painless lump in the skin. You may have a single mass or clusters of lumps. Most lumps stay the same for months, but a lump can also grow quickly. The lumps tend to be flesh-colored, pink, or red, and look similar to rheumatoid nodules. - Perforating granuloma annulare. This rare type usually develops on the hands and fingers. It causes small bumps that often feel scaly. The bumps may leak fluid, itch, or feel painful. Some people have a few bumps. Others develop widespread bumps that join together to form raised patches on the skin. When this type clears, it may leave scars on the skin. - Patch granuloma annulare. When this rare type develops, you see one or more patches on the skin. The patches may be red, reddish brown, or violet. Some people have one or a few patches; others have widespread patches.     How is granuloma annulare diagnosed? Granuloma annulare can usually be diagnosed simply based on its characteristic appearance. If diagnosis if unclear, a skin biopsy may be performed that shows degeneration of dermal collagen surrounded by an inflammatory reaction. Other testing may be done to rule out associated diseases such as thyroid or diabetes. How do we treat granuloma annulare? Most people do not need treatment as the condition may clear by itself from a few months to years. If left alone, most rashes will clear within 2 years. If you develop perforating granuloma annulare, you may see scarring when the granuloma clears. If you have a few noticeable patches, these are often treated with:  • Topical corticosteroids help reduce inflammation and clear the skin more quickly. • Corticosteroid injections into affected skin patches to reduce the inflammation, which can help your skin clear more quickly. • Topical calcineurin inhibitors such as tacrolimus and pimecrloimus  • Cryotherapy freezes your skin, which can eliminate the raised patches  If the granuloma annulare covers much of your skin or you have a deep lump in your skin, you'll have different treatment options. For these types of granuloma annulare, you may need:  • Imiquimod is a medication used to treat malaria that is also effective at clearing the rash of granuloma annulare. . In one study, patients started to see results after taking the medication for about 3 months. • Light therapy: Exposing the skin with granuloma annulare to ultraviolet (UV) light in a controlled way can be helpful. Some people receive a type of light therapy called PUVA. This involves taking a medication called psoralen and then treating the skin with UVA light. The medication makes your skin more sensitive to light, so light therapy can be more effective. Another type of light therapy, laser treatments, can also be helpful.   While granuloma annulare is not an infection, an antibiotic may help be helpful for some people to decrease inflammation. Other options for widespread granuloma annulare that does not respond to other forms of treatment include:  • Systemic steroids  • Isotretinoin  • Methotrexate  • Potassium iodide  • Dapsone  • Hydroxychloroquine  • Pentoxifylline  • Allopurinol (note: allopurinol has also be cited as a cause of disseminated granuloma annulare)  • Combination of antibiotics once monthly: rifampicin, ofloxacin, minocycline  • Ciclosporin  • Biologics particularly TNF?  inhibitors such as adalimumab and infliximab      Scribe Attestation    I,:  Thu Quiroz MA am acting as a scribe while in the presence of the attending physician.:       I,:  Franchesca Sanders MD personally performed the services described in this documentation    as scribed in my presence.:

## 2023-07-27 NOTE — PATIENT INSTRUCTIONS
1. MELANOCYTIC NEVI ("Moles")  Assessment and Plan:  Based on a thorough discussion of this condition and the management approach to it (including a comprehensive discussion of the known risks, side effects and potential benefits of treatment), the patient (family) agrees to implement the following specific plan:  When outside we recommend using a wide brim hat, sunglasses, long sleeve and pants, sunscreen with SPF 48+ with reapplication every 2 hours, or SPF specific clothing   Benign, reassured  Annual skin check     Melanocytic Nevi  Melanocytic nevi ("moles") are tan or brown, raised or flat areas of the skin which have an increased number of melanocytes. Melanocytes are the cells in our body which make pigment and account for skin color. Some moles are present at birth (I.e., "congenital nevi"), while others come up later in life (i.e., "acquired nevi"). The sun can stimulate the body to make more moles. Sunburns are not the only thing that triggers more moles. Chronic sun exposure can do it too. Clinically distinguishing a healthy mole from melanoma may be difficult, even for experienced dermatologists. The "ABCDE's" of moles have been suggested as a means of helping to alert a person to a suspicious mole and the possible increased risk of melanoma. The suggestions for raising alert are as follows:    Asymmetry: Healthy moles tend to be symmetric, while melanomas are often asymmetric. Asymmetry means if you draw a line through the mole, the two halves do not match in color, size, shape, or surface texture. Asymmetry can be a result of rapid enlargement of a mole, the development of a raised area on a previously flat lesion, scaling, ulceration, bleeding or scabbing within the mole. Any mole that starts to demonstrate "asymmetry" should be examined promptly by a board certified dermatologist.     Border: Healthy moles tend to have discrete, even borders.   The border of a melanoma often blends into the normal skin and does not sharply delineate the mole from normal skin. Any mole that starts to demonstrate "uneven borders" should be examined promptly by a board certified dermatologist.     Color: Healthy moles tend to be one color throughout. Melanomas tend to be made up of different colors ranging from dark black, blue, white, or red. Any mole that demonstrates a color change should be examined promptly by a board certified dermatologist.     Diameter: Healthy moles tend to be smaller than 0.6 cm in size; an exception are "congenital nevi" that can be larger. Melanomas tend to grow and can often be greater than 0.6 cm (1/4 of an inch, or the size of a pencil eraser). This is only a guideline, and many normal moles may be larger than 0.6 cm without being unhealthy. Any mole that starts to change in size (small to bigger or bigger to smaller) should be examined promptly by a board certified dermatologist.     Evolving: Healthy moles tend to "stay the same."  Melanomas may often show signs of change or evolution such as a change in size, shape, color, or elevation. Any mole that starts to itch, bleed, crust, burn, hurt, or ulcerate or demonstrate a change or evolution should be examined promptly by a board certified dermatologist.        2. LENTIGO  Assessment and Plan:  Based on a thorough discussion of this condition and the management approach to it (including a comprehensive discussion of the known risks, side effects and potential benefits of treatment), the patient (family) agrees to implement the following specific plan:  When outside we recommend using a wide brim hat, sunglasses, long sleeve and pants, sunscreen with SPF 71+ with reapplication every 2 hours, or SPF specific clothing       What is a lentigo? A lentigo is a pigmented flat or slightly raised lesion with a clearly defined edge. Unlike an ephelis (freckle), it does not fade in the winter months.  There are several kinds of lentigo. The name lentigo originally referred to its appearance resembling a small lentil. The plural of lentigo is lentigines, although “lentigos” is also in common use. Who gets lentigines? Lentigines can affect males and females of all ages and races. Solar lentigines are especially prevalent in fair skinned adults. Lentigines associated with syndromes are present at birth or arise during childhood. What causes lentigines? Common forms of lentigo are due to exposure to ultraviolet radiation:  Sun damage including sunburn   Indoor tanning   Phototherapy, especially photochemotherapy (PUVA)    Ionizing radiation, eg radiation therapy, can also cause lentigines. Several familial syndromes associated with widespread lentigines originate from mutations in Maurisio-MAP kinase, mTOR signaling and PTEN pathways. What is the treatment for lentigines? Most lentigines are left alone. Attempts to lighten them may not be successful. The following approaches are used:  SPF 50+ broad-spectrum sunscreen   Hydroquinone bleaching cream   Alpha hydroxy acids   Vitamin C   Retinoids   Azelaic acid   Chemical peels  Individual lesions can be permanently removed using:  Cryotherapy   Intense pulsed light   Pigment lasers    How can lentigines be prevented? Lentigines associated with exposure ultraviolet radiation can be prevented by very careful sun protection. Clothing is more successful at preventing new lentigines than are sunscreens. What is the outlook for lentigines? Lentigines usually persist. They may increase in number with age and sun exposure. Some in sun-protected sites may fade and disappear.     3. MENDEZ ANGIOMAS  Assessment and Plan:  Based on a thorough discussion of this condition and the management approach to it (including a comprehensive discussion of the known risks, side effects and potential benefits of treatment), the patient (family) agrees to implement the following specific plan:  Monitor for changes  Benign, reassured    Assessment and Plan:    Cherry angioma, also known as [employer] spots, are benign vascular skin lesions. A "cherry angioma" is a firm red, blue or purple papule, 0.1-1 cm in diameter. When thrombosed, they can appear black in colour until evaluated with a dermatoscope when the red or purple colour is more easily seen. Cherry angioma may develop on any part of the body but most often appear on the scalp, face, lips and trunk. An angioma is due to proliferating endothelial cells; these are the cells that line the inside of a blood vessel. Angiomas can arise in early life or later in life; the most common type of angioma is a cherry angioma. Cherry angiomas are very common in males and females of any age or race. They are more noticeable in white skin than in skin of colour. They markedly increase in number from about the age of 36. There may be a family history of similar lesions. Eruptive cherry angiomas have been rarely reported to be associated with internal malignancy. The cause of angiomas is unknown. Genetic analysis of cherry angiomas has shown that they frequently carry specific somatic missense mutations in the GNAQ and GNA11 (Q209H) genes, which are involved in other vascular and melanocytic proliferations. 4. SEBORRHEIC KERATOSIS; NON-INFLAMED  Assessment and Plan:  Based on a thorough discussion of this condition and the management approach to it (including a comprehensive discussion of the known risks, side effects and potential benefits of treatment), the patient (family) agrees to implement the following specific plan:  Monitor for changes  Benign, reassured    Seborrheic Keratosis  A seborrheic keratosis is a harmless warty spot that appears during adult life as a common sign of skin aging. Seborrheic keratoses can arise on any area of skin, covered or uncovered, with the usual exception of the palms and soles. They do not arise from mucous membranes. Seborrheic keratoses can have highly variable appearance. Seborrheic keratoses are extremely common. It has been estimated that over 90% of adults over the age of 61 years have one or more of them. They occur in males and females of all races, typically beginning to erupt in the 35s or 45s. They are uncommon under the age of 21 years. The precise cause of seborrhoeic keratoses is not known. Seborrhoeic keratoses are considered degenerative in nature. As time goes by, seborrheic keratoses tend to become more numerous. Some people inherit a tendency to develop a very large number of them; some people may have hundreds of them. There is no easy way to remove multiple lesions on a single occasion. Unless a specific lesion is "inflamed" and is causing pain or stinging/burning or is bleeding, most insurance companies do not authorize treatment. 5. XEROSIS ("DRY SKIN")  Based on a thorough discussion of this condition and the management approach to it (including a comprehensive discussion of the known risks, side effects and potential benefits of treatment), the patient (family) agrees to implement the following specific plan:  Use moisturizer like Eucerin,Cerave or Aveeno Cream 3 times a day for the dry skin            Dry skin refers to skin that feels dry to touch. Dry skin has a dull surface with a rough, scaly quality. The skin is less pliable and cracked. When dryness is severe, the skin may become inflamed and fissured. Although any body site can be dry, dry skin tends to affect the shins more than any other site. Dry skin is lacking moisture in the outer horny cell layer (stratum corneum) and this results in cracks in the skin surface. Dry skin is also called xerosis, xeroderma or asteatosis (lack of fat). It can affect males and females of all ages. There is some racial variability in water and lipid content of the skin.   Dry skin that starts in early childhood may be one of about 20 types of ichthyosis (fish-scale skin). There is often a family history of dry skin. Dry skin is commonly seen in people with atopic dermatitis. Nearly everyone > 60 years has dry skin. Dry skin that begins later may be seen in people with certain diseases and conditions. Postmenopausal women  Hypothyroidism  Chronic renal disease   Malnutrition and weight loss   Subclinical dermatitis   Treatment with certain drugs such as oral retinoids, diuretics and epidermal growth factor receptor inhibitors      What is the treatment for dry skin? The mainstay of treatment of dry skin and ichthyosis is moisturisers/emollients. They should be applied liberally and often enough to:  Reduce itch   Improve the barrier function   Prevent entry of irritants, bacteria   Reduce transepidermal water loss. How can dry skin be prevented? Eliminate aggravating factors:  Reduce the frequency of bathing. A humidifier in winter and air conditioner in summer   Compare having a short shower with a prolonged soak in a bath. Use lukewarm, not hot, water. Replace standard soap with a substitute such as a synthetic detergent cleanser, water-miscible emollient, bath oil, anti-pruritic tar oil, colloidal oatmeal etc.   Apply an emollient liberally and often, particularly shortly after bathing, and when itchy. The drier the skin, the thicker this should be, especially on the hands. What is the outlook for dry skin? A tendency to dry skin may persist life-long, or it may improve once contributing factors are controlled.     6. ANDROGENETIC ALOPECIA OR FEMALE/MALE PATTERN HAIR LOSS  Assessment and Plan:Based on a thorough discussion of this condition and the management approach to it (including a comprehensive discussion of the known risks, side effects and potential benefits of treatment), the patient (family) agrees to implement the following specific plan:  Discussed treatment options, such as:  over the counter rogaine (minoxidil) 5% foam. Use one cap full a day on dry hair, part hair and apply directly to scalp. Do not do too close to bedtime. Need to do this daily. If you stop abruptly, you will lose hair. Can taper off if you don't like it. Could do laser therapy like laser caps or quintana (examples: capillus, laser hair max). Buy over the counter, online. Cosmetic treatments like platelet rich plasma (PRP) do it monthly for 3 months. Dr. Maria Guadalupe Vargas in our practice does this. Hair transplant surgery   Hair fibers, put on topically so it looks more full (example topix)  Oral pills: spironolactone (hormonal blood pressure medication, risks include decreased blood pressure, lightheadedness, abnormal menses, breast tenderness, need to be careful of potassium intake), finasteride/dutasteride (hormonal medication, not recommended if personal or family history of breast/ovarian cancers) and minoxidil oral pills (risk of cardiac side effects such as swelling and fluid around heart)  Plan for topical minoxidil    7. GRANULOMA ANNULARE  Assessment and Plan:  Based on a thorough discussion of this condition and the management approach to it (including a comprehensive discussion of the known risks, side effects and potential benefits of treatment), the patient (family) agrees to implement the following specific plan:  Itchy but differs treatment for now     What is granuloma annulare? Granuloma annulare, also known as necrobiotic papulosis, is a non-contagious, benign skin condition that causes a rash. It is usually asymptomatic and can have a wide range of presentations on the skin. Most commonly, patients see a slightly raised patch, thickened, ring-shaped patch. These tend to be found on the hand, arm, foot, or leg, but can appear anywhere on the skin. It is more common in females than males. Interestingly, the perforating type of granuloma annulare most often affects people who live in [place]. What causes granuloma annulare?   Most people who develop granuloma annulare are otherwise healthy. Children are most likely to get the localized, subcutaneous form, while older adults tend to develop generalized and perforating types. It is still unclear what exactly causes granuloma annulare, but there is evidence to suggest that it may follow skin injury, certain medications, and other diseases. The inflammation is mediated by tumor necrosis factor. Those who have HIV, lymphoma, thyroid disease, or diabetes may be especially prone. What are the signs and symptoms of granuloma annulare? Granuloma annulare can occur at any site of the body and can be quite widespread. It may by asymptomatic, but often quite tender if the area affected is bumped. The ring-like rash can be easily confused with tinea corporis, which is a type of fungal infection on the body. In localized granuloma annulare, there is a raised ring-shaped patch on the skin. They are usually reddish in color, but can be pink, violet, or skin-colored. Bumps on the skin often develop prior, and join together to form the rash. Most common sites are the skin over joints, knuckles, backs of both hands, and on top of the foot. The center of each ring-like rash is often a little depression. Some people develop widespread bumps on their skin that grow together to form large patches in generalized granuloma annulare. These appear in many colors as widespread skin-colored, pinkish to slightly mauve-colored patches. Other types include:  Subcutaneous granuloma annulare. When this type develops, you often see a roundish, firm, and usually painless lump in the skin. You may have a single mass or clusters of lumps. Most lumps stay the same for months, but a lump can also grow quickly. The lumps tend to be flesh-colored, pink, or red, and look similar to rheumatoid nodules. Perforating granuloma annulare. This rare type usually develops on the hands and fingers. It causes small bumps that often feel scaly.  The bumps may leak fluid, itch, or feel painful. Some people have a few bumps. Others develop widespread bumps that join together to form raised patches on the skin. When this type clears, it may leave scars on the skin. Patch granuloma annulare. When this rare type develops, you see one or more patches on the skin. The patches may be red, reddish brown, or violet. Some people have one or a few patches; others have widespread patches. How is granuloma annulare diagnosed? Granuloma annulare can usually be diagnosed simply based on its characteristic appearance. If diagnosis if unclear, a skin biopsy may be performed that shows degeneration of dermal collagen surrounded by an inflammatory reaction. Other testing may be done to rule out associated diseases such as thyroid or diabetes. How do we treat granuloma annulare? Most people do not need treatment as the condition may clear by itself from a few months to years. If left alone, most rashes will clear within 2 years. If you develop perforating granuloma annulare, you may see scarring when the granuloma clears. If you have a few noticeable patches, these are often treated with:  Topical corticosteroids help reduce inflammation and clear the skin more quickly. Corticosteroid injections into affected skin patches to reduce the inflammation, which can help your skin clear more quickly. Topical calcineurin inhibitors such as tacrolimus and pimecrloimus  Cryotherapy freezes your skin, which can eliminate the raised patches  If the granuloma annulare covers much of your skin or you have a deep lump in your skin, you'll have different treatment options. For these types of granuloma annulare, you may need:  Imiquimod is a medication used to treat malaria that is also effective at clearing the rash of granuloma annulare. . In one study, patients started to see results after taking the medication for about 3 months.   Light therapy: Exposing the skin with granuloma annulare to ultraviolet (UV) light in a controlled way can be helpful. Some people receive a type of light therapy called PUVA. This involves taking a medication called psoralen and then treating the skin with UVA light. The medication makes your skin more sensitive to light, so light therapy can be more effective. Another type of light therapy, laser treatments, can also be helpful. While granuloma annulare is not an infection, an antibiotic may help be helpful for some people to decrease inflammation. Other options for widespread granuloma annulare that does not respond to other forms of treatment include:  Systemic steroids  Isotretinoin  Methotrexate  Potassium iodide  Dapsone  Hydroxychloroquine  Pentoxifylline  Allopurinol (note: allopurinol has also be cited as a cause of disseminated granuloma annulare)  Combination of antibiotics once monthly: rifampicin, ofloxacin, minocycline  Ciclosporin  Biologics particularly TNF?  inhibitors such as adalimumab and infliximab

## 2023-08-25 ENCOUNTER — OFFICE VISIT (OUTPATIENT)
Dept: FAMILY MEDICINE CLINIC | Facility: CLINIC | Age: 43
End: 2023-08-25
Payer: COMMERCIAL

## 2023-08-25 VITALS
BODY MASS INDEX: 31.05 KG/M2 | HEIGHT: 66 IN | SYSTOLIC BLOOD PRESSURE: 126 MMHG | OXYGEN SATURATION: 99 % | WEIGHT: 193.2 LBS | TEMPERATURE: 97.4 F | DIASTOLIC BLOOD PRESSURE: 72 MMHG | HEART RATE: 78 BPM

## 2023-08-25 DIAGNOSIS — R00.0 TACHYCARDIA: ICD-10-CM

## 2023-08-25 DIAGNOSIS — Z13.220 SCREENING CHOLESTEROL LEVEL: ICD-10-CM

## 2023-08-25 DIAGNOSIS — Z13.0 SCREENING FOR DEFICIENCY ANEMIA: ICD-10-CM

## 2023-08-25 DIAGNOSIS — Z00.00 ANNUAL PHYSICAL EXAM: Primary | ICD-10-CM

## 2023-08-25 PROCEDURE — 99396 PREV VISIT EST AGE 40-64: CPT | Performed by: FAMILY MEDICINE

## 2023-08-25 NOTE — PROGRESS NOTES
[address]    NAME: Jeanne Baires  AGE: 40 y.o. SEX: female  : 1980     DATE: 2023     Assessment and Plan:     Problem List Items Addressed This Visit    None  Visit Diagnoses     Annual physical exam    -  Primary    Relevant Orders    Lipid Panel with Direct LDL reflex    CBC and differential    Comprehensive metabolic panel    Screening for deficiency anemia        Relevant Orders    CBC and differential    Comprehensive metabolic panel    Screening cholesterol level        Relevant Orders    Lipid Panel with Direct LDL reflex    Tachycardia              Immunizations and preventive care screenings were discussed with patient today. Appropriate education was printed on patient's after visit summary. Counseling:  Alcohol/drug use: discussed moderation in alcohol intake, the recommendations for healthy alcohol use, and avoidance of illicit drug use. Dental Health: discussed importance of regular tooth brushing, flossing, and dental visits. Injury prevention: discussed safety/seat belts, safety helmets, smoke detectors, carbon dioxide detectors, and smoking near bedding or upholstery. Sexual health: discussed sexually transmitted diseases, partner selection, use of condoms, avoidance of unintended pregnancy, and contraceptive alternatives. · Exercise: the importance of regular exercise/physical activity was discussed. Recommend exercise 3-5 times per week for at least 30 minutes. No follow-ups on file. Chief Complaint:     Chief Complaint   Patient presents with   • Annual Exam      History of Present Illness:     Adult Annual Physical   Patient here for a comprehensive physical exam. The patient reports no problems. Pt needs a physical for work. Diet and Physical Activity  · Diet/Nutrition: well balanced diet and consuming 3-5 servings of fruits/vegetables daily. · Exercise: no formal exercise. Active about 3-4 years ago, lost 90lb, gaining weight again. Depression Screening  PHQ-2/9 Depression Screening    Little interest or pleasure in doing things: 0 - not at all  Feeling down, depressed, or hopeless: 0 - not at all  PHQ-2 Score: 0  PHQ-2 Interpretation: Negative depression screen       General Health  · Sleep: sleeps well. · Hearing: normal - bilateral.  · Vision: no vision problems. · Dental: regular dental visits and brushes teeth twice daily. /GYN Health  · Patient is: perimenopausal  · Last menstrual period: currently on a period, had an episode where her period was 40 days apart. · Contraceptive method: n/a. Review of Systems:     Review of Systems   Constitutional: Negative for chills and fever. HENT: Negative for congestion, rhinorrhea and sore throat. Respiratory: Negative for chest tightness and shortness of breath. Cardiovascular: Negative for chest pain. Gastrointestinal: Negative for abdominal pain, blood in stool, constipation, diarrhea, nausea and vomiting. Genitourinary: Negative for dysuria, hematuria, vaginal bleeding and vaginal discharge. Allergic/Immunologic: Positive for environmental allergies. Neurological: Negative for headaches. Psychiatric/Behavioral: Negative for sleep disturbance.            Past Medical History:     Past Medical History:   Diagnosis Date   • Asthma Unsure   • Bladder incontinence     last assessed: 2014   • Disease of thyroid gland    • Hypothyroidism Unsure   • Mental disorder    • Pap smear abnormality of cervix with ASCUS favoring benign 10/29/2014    HPV neg      Past Surgical History:     Past Surgical History:   Procedure Laterality Date   • DILATION AND EVACUATION      surgically induced  @ 7 wks      Social History:     Social History     Socioeconomic History   • Marital status: /Civil Union     Spouse name: None   • Number of children: 1   • Years of education: None   • Highest education level: None   Occupational History   • None   Tobacco Use   • Smoking status: Former     Packs/day: 0.50     Years: 15.00     Total pack years: 7.50     Types: Cigarettes     Start date: 2001     Quit date: 2020     Years since quitting: 3.6     Passive exposure: Past   • Smokeless tobacco: Never   Vaping Use   • Vaping Use: Never used   Substance and Sexual Activity   • Alcohol use: Not Currently   • Drug use: Never   • Sexual activity: Not Currently     Partners: Male     Birth control/protection: None   Other Topics Concern   • None   Social History Narrative    Always uses seatbelt    Exercising regularly    Housewife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    Lives in mobile home    Lives with spouse: children    No advance directives    Primary language is English    Spiritism    Supportive and safe     Social Determinants of Health     Financial Resource Strain: Not on file   Food Insecurity: Not on file   Transportation Needs: Not on file   Physical Activity: Not on file   Stress: Not on file   Social Connections: Not on file   Intimate Partner Violence: Not on file   Housing Stability: Not on file      Family History:     Family History   Problem Relation Age of Onset   • Alcohol abuse Mother    • Mental illness Mother         mental disorder   • Substance Abuse Mother    • Other Mother         urinary incontinence   • Alcohol abuse Father    • Mental illness Father         mental disorder   • Substance Abuse Father    • Other Sister         urinary incontinence   • No Known Problems Sister    • No Known Problems Sister    • No Known Problems Sister    • No Known Problems Daughter    • Arthritis Maternal Grandmother    • Hypertension Maternal Grandmother    • Osteoporosis Maternal Grandmother    • Kidney disease Maternal Grandmother         renal disease   • Heart disease Maternal Grandmother    • Thyroid disease Maternal Grandmother    • Diabetes Maternal Grandfather    • Heart attack Maternal Grandfather    • No Known Problems Paternal Grandmother    • No Known Problems Paternal Grandfather    • Anemia Maternal Aunt    • No Known Problems Maternal Aunt    • No Known Problems Maternal Aunt    • Anemia Paternal Aunt    • Varicose Veins Paternal Aunt         of lower extremities   • No Known Problems Paternal Aunt    • No Known Problems Maternal Uncle    • No Known Problems Maternal Uncle    • No Known Problems Maternal Uncle    • No Known Problems Paternal Uncle    • No Known Problems Paternal Uncle       Current Medications:     Current Outpatient Medications   Medication Sig Dispense Refill   • albuterol (Proventil HFA) 90 mcg/act inhaler Inhale 2 puffs every 4 (four) hours as needed for wheezing 18 g 2   • Cetirizine HCl 10 MG CAPS Take 1 capsule by mouth daily     • levothyroxine 100 mcg tablet TAKE 1 TABLET BY MOUTH EVERY DAY 90 tablet 0   • multivitamin (THERAGRAN) TABS Take 1 tablet by mouth daily       No current facility-administered medications for this visit. Allergies: Allergies   Allergen Reactions   • Milk (Cow) GI Intolerance   • Pollen Extract Sneezing      Physical Exam:     /72 (BP Location: Right arm, Patient Position: Sitting, Cuff Size: Large)   Pulse 78   Temp (!) 97.4 °F (36.3 °C) (Temporal)   Ht 5' 6.25" (1.683 m)   Wt 87.6 kg (193 lb 3.2 oz)   LMP 08/25/2023 (Exact Date)   SpO2 99%   BMI 30.95 kg/m²     Physical Exam  Vitals reviewed. Constitutional:       General: She is not in acute distress. Appearance: Normal appearance. She is obese. She is not ill-appearing, toxic-appearing or diaphoretic. Cardiovascular:      Rate and Rhythm: Normal rate and regular rhythm. Pulses: Normal pulses. Heart sounds: Normal heart sounds. No murmur heard. Pulmonary:      Effort: Pulmonary effort is normal. No respiratory distress. Breath sounds: Normal breath sounds. Abdominal:      General: Abdomen is flat. Palpations: Abdomen is soft.    Musculoskeletal:         General: No swelling, tenderness, deformity or signs of injury. Skin:     Capillary Refill: Capillary refill takes less than 2 seconds. 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 Mental Status: She is alert.    Psychiatric:         Mood and Affect: Mood normal.              Pao Mckeon MD  [id number] [facility]

## 2023-09-16 DIAGNOSIS — E03.9 ACQUIRED HYPOTHYROIDISM: ICD-10-CM

## 2023-09-18 RX ORDER — LEVOTHYROXINE SODIUM 0.1 MG/1
TABLET ORAL
Qty: 90 TABLET | Refills: 0 | Status: SHIPPED | OUTPATIENT
Start: 2023-09-18

## 2023-09-19 ENCOUNTER — NURSE TRIAGE (OUTPATIENT)
Age: 43
End: 2023-09-19

## 2023-09-19 ENCOUNTER — NURSE TRIAGE (OUTPATIENT)
Dept: OTHER | Facility: OTHER | Age: 43
End: 2023-09-19

## 2023-09-19 NOTE — TELEPHONE ENCOUNTER
Regarding: Round worm advice  ----- Message from Vinnie Quick sent at 9/19/2023  3:26 PM EDT -----  "I was cleaning out my cats bowl and saw a round worm. I'm not sure if it had eggs or if I came in contact.  Do I need to be seen?"

## 2023-09-19 NOTE — TELEPHONE ENCOUNTER
Patient was exposed to some "round worm" via her skin when touching a bowl with water after wild cats. Patient stated that she washed her hands with soap after water with the worm got on her skin. Patient is concerned if she need to take any preventative medication. Patient was advised to follow general hygiene, to call back if develops rash, itching or other symptoms. Patient verbalized understanding of the advice.

## 2023-09-19 NOTE — TELEPHONE ENCOUNTER
Reason for Disposition  • Pinworm exposure, questions about    Additional Information  • White, threadlike 1/4 to 1/2 inch worm (6 to 12 mm)    Answer Assessment - Initial Assessment Questions  1. APPEARANCE of WORM: "What does it look like and does it move?"       A round worm   2. SIZE: "How long is the worm?"       Coiled, unable to assess   3. LOCATION: "Where did it come from?" (e.g., from rectum, in stool, through mouth)      Worm was in a water, water got on patient's skin   4. WHEN: "When did it happen?"      This morning   5.  OTHER SYMPTOMS: "Do you have any other symptoms?" (e.g., stomach pain, vomiting)      No    Protocols used: PINWORMS-ADULT-AH, WORMS - OTHER THAN PINWORMS-ADULT-AH

## 2023-10-17 ENCOUNTER — OFFICE VISIT (OUTPATIENT)
Dept: FAMILY MEDICINE CLINIC | Facility: CLINIC | Age: 43
End: 2023-10-17
Payer: COMMERCIAL

## 2023-10-17 VITALS
TEMPERATURE: 97.9 F | SYSTOLIC BLOOD PRESSURE: 122 MMHG | OXYGEN SATURATION: 99 % | HEIGHT: 66 IN | HEART RATE: 85 BPM | BODY MASS INDEX: 30.41 KG/M2 | WEIGHT: 189.2 LBS | DIASTOLIC BLOOD PRESSURE: 82 MMHG

## 2023-10-17 DIAGNOSIS — J01.00 ACUTE NON-RECURRENT MAXILLARY SINUSITIS: ICD-10-CM

## 2023-10-17 DIAGNOSIS — J40 BRONCHITIS: Primary | ICD-10-CM

## 2023-10-17 DIAGNOSIS — J45.909 UNCOMPLICATED ASTHMA, UNSPECIFIED ASTHMA SEVERITY, UNSPECIFIED WHETHER PERSISTENT: ICD-10-CM

## 2023-10-17 PROCEDURE — 99214 OFFICE O/P EST MOD 30 MIN: CPT | Performed by: FAMILY MEDICINE

## 2023-10-17 RX ORDER — ALBUTEROL SULFATE 90 UG/1
2 AEROSOL, METERED RESPIRATORY (INHALATION) EVERY 6 HOURS PRN
Qty: 8 G | Refills: 3 | Status: SHIPPED | OUTPATIENT
Start: 2023-10-17

## 2023-10-17 RX ORDER — AMOXICILLIN 500 MG/1
500 CAPSULE ORAL EVERY 8 HOURS SCHEDULED
Qty: 30 CAPSULE | Refills: 0 | Status: SHIPPED | OUTPATIENT
Start: 2023-10-17 | End: 2023-10-27

## 2023-10-17 NOTE — LETTER
October 17, 2023     Patient: Cydney Farmer  YOB: 1980  Date of Visit: 10/17/2023      To Whom it May Concern:    Cydney Farmer is under my professional care. Marina Herbert was seen in my office on 10/17/2023. Marina Herbert may return to work on 10/19/23 . If you have any questions or concerns, please don't hesitate to call.          Sincerely,          Reji Tony MD        CC: No Recipients

## 2023-10-17 NOTE — PROGRESS NOTES
Outpatient Progress Note    Assessment/Plan:    Problem List Items Addressed This Visit          Respiratory    Asthma    Relevant Medications    amoxicillin (AMOXIL) 500 mg capsule    albuterol (ProAir HFA) 90 mcg/act inhaler    Bronchitis - Primary    Relevant Medications    amoxicillin (AMOXIL) 500 mg capsule    albuterol (ProAir HFA) 90 mcg/act inhaler    Acute non-recurrent maxillary sinusitis    Relevant Medications    amoxicillin (AMOXIL) 500 mg capsule      Patient with recent sick contacts presenting with upper respiratory symptoms  Provide some mild burning sensation in the chest while coughing as well as maxillary sinus tenderness and pressure  Patient does work in a school setting, in [employer] [place]  Although this symptoms is likely self-limiting, will treat patient with antibiotics to prevent spread of infection from bacterial sources  Amoxicillin for total of 10 days    Disposition:     I have spent a total time of 10 minutes on the day of the encounter for this patient including       Encounter provider: Kaela Nguyen MD     Provider located at: [address]  [phone]     Recent Visits  No visits were found meeting these conditions. Showing recent visits within past 7 days and meeting all other requirements  Today's Visits  Date Type Provider Dept   10/17/23 Office Visit MD Daniel Prado   Showing today's visits and meeting all other requirements  Future Appointments  No visits were found meeting these conditions. Showing future appointments within next 150 days and meeting all other requirements     Subjective:   Chi Dean is a 40 y.o. female who is concerned about COVID-19. Patient's symptoms include fatigue, nasal congestion, rhinorrhea, sore throat, cough (productive cough), chest tightness and myalgias.  Patient denies fever (99.4 on Sunday), chills, malaise, anosmia, loss of taste, shortness of breath, abdominal pain, nausea, vomiting, diarrhea and headaches. - Date of symptom onset: 10/15/2023      COVID-19 vaccination status: Fully vaccinated (primary series)    Exposure:   Contact with a person who is under investigation (PUI) for or who is positive for COVID-19 within the last 14 days?: No    Hospitalized recently for fever and/or lower respiratory symptoms?: No      Currently a healthcare worker that is involved in direct patient care?: No      Works in a special setting where the risk of COVID-19 transmission may be high? (this may include long-term care, correctional and senior care facilities; homeless shelters; assisted-living facilities and group homes.): No      Resident in a special setting where the risk of COVID-19 transmission may be high? (this may include long-term care, correctional and senior care facilities; homeless shelters; assisted-living facilities and group homes.): No       tested negative for COVID-19  Denies any increased inhaler use  "When I cough at night, I have burning sensation"    Lab Results   Component Value Date    SARSCOV2 Negative 01/24/2021       Review of Systems   Constitutional:  Positive for fatigue. Negative for chills and fever (99.4 on Sunday). HENT:  Positive for congestion, rhinorrhea and sore throat. Respiratory:  Positive for cough (productive cough) and chest tightness. Negative for shortness of breath. Gastrointestinal:  Negative for abdominal pain, diarrhea, nausea and vomiting. Musculoskeletal:  Positive for myalgias. Neurological:  Negative for headaches.      Current Outpatient Medications on File Prior to Visit   Medication Sig    Cetirizine HCl 10 MG CAPS Take 1 capsule by mouth daily    levothyroxine 100 mcg tablet TAKE 1 TABLET BY MOUTH EVERY DAY    multivitamin (THERAGRAN) TABS Take 1 tablet by mouth daily    [DISCONTINUED] albuterol (Proventil HFA) 90 mcg/act inhaler Inhale 2 puffs every 4 (four) hours as needed for wheezing       Objective:    /82 (BP Location: Right arm, Patient Position: Sitting, Cuff Size: Large)   Pulse 85   Temp 97.9 °F (36.6 °C) (Temporal)   Ht 5' 6.25" (1.683 m)   Wt 85.8 kg (189 lb 3.2 oz)   LMP 10/12/2023 (Approximate)   SpO2 99%   BMI 30.31 kg/m²      Physical Exam  Vitals reviewed. Constitutional:       General: She is not in acute distress. Appearance: Normal appearance. She is obese. She is not ill-appearing, toxic-appearing or diaphoretic. HENT:      Head:      Comments: Maxillary sinus tenderness     Right Ear: There is no impacted cerumen. Left Ear: There is no impacted cerumen. Nose: Congestion present. Mouth/Throat:      Mouth: Mucous membranes are dry. Pharynx: Oropharynx is clear. No oropharyngeal exudate or posterior oropharyngeal erythema. Cardiovascular:      Rate and Rhythm: Normal rate and regular rhythm. Pulses: Normal pulses. Heart sounds: Normal heart sounds. No murmur heard. Pulmonary:      Effort: Pulmonary effort is normal. No respiratory distress. Breath sounds: Normal breath sounds. Abdominal:      General: Abdomen is flat. Bowel sounds are normal. There is no distension. Palpations: Abdomen is soft. Musculoskeletal:         General: No swelling, tenderness, deformity or signs of injury. Skin:     General: Skin is warm and dry. Capillary Refill: Capillary refill takes less than 2 seconds. Coloration: Skin is not jaundiced. Neurological:      General: No focal deficit present. Mental Status: She is alert and oriented to person, place, and time.    Psychiatric:         Mood and Affect: Mood normal.             Kaela Nguyen MD

## 2023-11-17 ENCOUNTER — HOSPITAL ENCOUNTER (OUTPATIENT)
Dept: RADIOLOGY | Facility: MEDICAL CENTER | Age: 43
Discharge: HOME | End: 2023-11-17
Payer: COMMERCIAL

## 2023-11-17 VITALS — WEIGHT: 189 LBS | BODY MASS INDEX: 30.37 KG/M2 | HEIGHT: 66 IN

## 2023-11-17 DIAGNOSIS — Z12.31 ENCOUNTER FOR SCREENING MAMMOGRAM FOR MALIGNANT NEOPLASM OF BREAST: ICD-10-CM

## 2023-11-17 PROCEDURE — 77067 SCR MAMMO BI INCL CAD: CPT

## 2023-11-17 PROCEDURE — 77063 BREAST TOMOSYNTHESIS BI: CPT

## 2023-12-15 ENCOUNTER — HOSPITAL ENCOUNTER (OUTPATIENT)
Dept: ULTRASOUND IMAGING | Facility: CLINIC | Age: 43
End: 2023-12-15
Payer: COMMERCIAL

## 2023-12-15 ENCOUNTER — HOSPITAL ENCOUNTER (OUTPATIENT)
Dept: MAMMOGRAPHY | Facility: CLINIC | Age: 43
End: 2023-12-15
Payer: COMMERCIAL

## 2023-12-15 DIAGNOSIS — R92.8 ABNORMAL MAMMOGRAM: ICD-10-CM

## 2023-12-15 DIAGNOSIS — E03.9 ACQUIRED HYPOTHYROIDISM: ICD-10-CM

## 2023-12-15 PROCEDURE — 76642 ULTRASOUND BREAST LIMITED: CPT

## 2023-12-15 PROCEDURE — 77065 DX MAMMO INCL CAD UNI: CPT

## 2023-12-15 PROCEDURE — G0279 TOMOSYNTHESIS, MAMMO: HCPCS

## 2023-12-15 RX ORDER — LEVOTHYROXINE SODIUM 0.1 MG/1
TABLET ORAL
Qty: 90 TABLET | Refills: 1 | Status: SHIPPED | OUTPATIENT
Start: 2023-12-15

## 2023-12-16 PROBLEM — J01.00 ACUTE NON-RECURRENT MAXILLARY SINUSITIS: Status: RESOLVED | Noted: 2023-10-17 | Resolved: 2023-12-16

## 2024-02-21 PROBLEM — Z01.419 ENCOUNTER FOR GYNECOLOGICAL EXAMINATION (GENERAL) (ROUTINE) WITHOUT ABNORMAL FINDINGS: Status: RESOLVED | Noted: 2020-01-22 | Resolved: 2024-02-21

## 2024-05-05 ENCOUNTER — OFFICE VISIT (OUTPATIENT)
Dept: URGENT CARE | Facility: MEDICAL CENTER | Age: 44
End: 2024-05-05
Payer: COMMERCIAL

## 2024-05-05 VITALS
HEART RATE: 80 BPM | OXYGEN SATURATION: 98 % | TEMPERATURE: 99 F | BODY MASS INDEX: 31.82 KG/M2 | DIASTOLIC BLOOD PRESSURE: 72 MMHG | HEIGHT: 66 IN | WEIGHT: 198 LBS | RESPIRATION RATE: 20 BRPM | SYSTOLIC BLOOD PRESSURE: 116 MMHG

## 2024-05-05 DIAGNOSIS — J02.9 SORE THROAT: ICD-10-CM

## 2024-05-05 DIAGNOSIS — J02.0 STREP PHARYNGITIS: Primary | ICD-10-CM

## 2024-05-05 LAB — S PYO AG THROAT QL: POSITIVE

## 2024-05-05 PROCEDURE — 99213 OFFICE O/P EST LOW 20 MIN: CPT | Performed by: PHYSICIAN ASSISTANT

## 2024-05-05 PROCEDURE — 87880 STREP A ASSAY W/OPTIC: CPT | Performed by: PHYSICIAN ASSISTANT

## 2024-05-05 RX ORDER — AMOXICILLIN 500 MG/1
500 CAPSULE ORAL EVERY 12 HOURS SCHEDULED
Qty: 20 CAPSULE | Refills: 0 | Status: SHIPPED | OUTPATIENT
Start: 2024-05-05 | End: 2024-05-15

## 2024-05-05 NOTE — LETTER
May 5, 2024     Patient: Mary Schulz   YOB: 1980   Date of Visit: 5/5/2024       To Whom it May Concern:    Mary Schulz was seen in my clinic on 5/5/2024. She may return to work on 5/7/2024 .    If you have any questions or concerns, please don't hesitate to call.         Sincerely,          Lux Blount PA-C        CC: No Recipients

## 2024-05-05 NOTE — PROGRESS NOTES
[facility]        NAME: Mary Schulz is a 43 y.o. female  : 1980    MRN: [id number]  DATE: May 5, 2024  TIME: 10:50 AM    Assessment and Plan   Strep pharyngitis [J02.0]  1. Strep pharyngitis  amoxicillin (AMOXIL) 500 mg capsule      2. Sore throat  POCT rapid ANTIGEN strepA            Patient Instructions     Strep pharyngitis  Amoxicillin as directed  Follow up with PCP in 3-5 days.  Proceed to  ER if symptoms worsen.    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  Denies fevers, chills, chest pain, shortness of breath.  Has been taking over-the-counter medications with no relief.    Sore Throat   This is a new problem. The current episode started yesterday. The problem has been rapidly worsening. The pain is worse on the left side. The maximum temperature recorded prior to her arrival was 101 - 101.9 F. The fever has been present for 1 to 2 days. The pain is at a severity of 7/10. Associated symptoms include abdominal pain, congestion, coughing, drooling, ear pain, headaches, a hoarse voice, neck pain, shortness of breath, stridor, swollen glands and trouble swallowing. Pertinent negatives include no diarrhea, ear discharge, plugged ear sensation or vomiting. She has had no exposure to strep or mono.       Review of Systems   Review of Systems   HENT:  Positive for congestion, drooling, ear pain, hoarse voice, sore throat and trouble swallowing. Negative for ear discharge.    Respiratory:  Positive for cough, shortness of breath and stridor.    Gastrointestinal:  Positive for abdominal pain. Negative for diarrhea and vomiting.   Musculoskeletal:  Positive for neck pain.   Neurological:  Positive for headaches.          Current Medications       Current Outpatient Medications:     albuterol (ProAir HFA) 90 mcg/act inhaler, Inhale 2 puffs every 6 (six) hours as needed for wheezing, Disp: 8 g, Rfl: 3    amoxicillin (AMOXIL) 500 mg capsule, Take 1 capsule (500 mg total) by mouth every 12 (twelve) hours for 10 days, Disp: 20 capsule, Rfl: 0    Cetirizine HCl 10 MG CAPS, Take 1 capsule by mouth daily, Disp: , Rfl:     levothyroxine 100 mcg tablet, TAKE 1 TABLET BY MOUTH EVERY DAY, Disp: 90 tablet, Rfl: 1    multivitamin (THERAGRAN) TABS, Take 1 tablet by mouth daily, Disp: , Rfl:     Current Allergies     Allergies as of 2024 - Reviewed 2024   Allergen Reaction Noted    Milk (cow) GI Intolerance 10/15/2014    Pollen extract Sneezing 10/15/2014            The following portions of the patient's history were reviewed and updated as appropriate: allergies, current medications, past family history, past medical history, past social history, past surgical history and problem list.     Past Medical History:   Diagnosis Date    Asthma Unsure    Bladder incontinence     last assessed: 2014    Disease of thyroid gland     Hypothyroidism Unsure    Mental disorder     Pap smear abnormality of cervix with ASCUS favoring benign 10/29/2014    HPV neg       Past Surgical History:   Procedure Laterality Date    DILATION AND EVACUATION      surgically induced  @ 7 wks       Family History   Problem Relation Age of Onset    Alcohol abuse Mother     Mental illness Mother         mental disorder    Substance Abuse Mother     Other Mother         urinary incontinence    Alcohol abuse Father     Mental illness Father         mental disorder    Substance Abuse Father     Other Sister         urinary incontinence    No Known Problems Sister     No Known Problems Sister     No Known Problems Sister     No Known Problems Daughter     Arthritis Maternal Grandmother     Hypertension Maternal Grandmother     Osteoporosis Maternal  "Grandmother     Kidney disease Maternal Grandmother         renal disease    Heart disease Maternal Grandmother     Thyroid disease Maternal Grandmother     Diabetes Maternal Grandfather     Heart attack Maternal Grandfather     No Known Problems Paternal Grandmother     No Known Problems Paternal Grandfather     Anemia Maternal Aunt     No Known Problems Maternal Aunt     No Known Problems Maternal Aunt     Anemia Paternal Aunt     Varicose Veins Paternal Aunt         of lower extremities    No Known Problems Paternal Aunt     No Known Problems Maternal Uncle     No Known Problems Maternal Uncle     No Known Problems Maternal Uncle     No Known Problems Paternal Uncle     No Known Problems Paternal Uncle          Medications have been verified.        Objective   /72   Pulse 80   Temp 99 °F (37.2 °C) (Temporal)   Resp 20   Ht 5' 6\" (1.676 m)   Wt 89.8 kg (198 lb)   SpO2 98%   BMI 31.96 kg/m²        Physical Exam     Physical Exam  Constitutional:       General: She is not in acute distress.     Appearance: She is well-developed. She is not diaphoretic.   HENT:      Head: Normocephalic and atraumatic.      Right Ear: Hearing, tympanic membrane, ear canal and external ear normal.      Left Ear: Hearing, tympanic membrane, ear canal and external ear normal.      Nose: Rhinorrhea present.      Mouth/Throat:      Mouth: Mucous membranes are moist. No oral lesions.      Pharynx: Oropharynx is clear. Uvula midline. Posterior oropharyngeal erythema present. No oropharyngeal exudate.   Cardiovascular:      Rate and Rhythm: Normal rate and regular rhythm.      Heart sounds: Normal heart sounds.   Pulmonary:      Effort: Pulmonary effort is normal. No respiratory distress.      Breath sounds: Normal breath sounds. No stridor. No wheezing, rhonchi or rales.   Chest:      Chest wall: No tenderness.   Musculoskeletal:      Cervical back: Normal range of motion and neck supple.   Lymphadenopathy:      Cervical: " Cervical adenopathy present.

## 2024-06-08 DIAGNOSIS — E03.9 ACQUIRED HYPOTHYROIDISM: ICD-10-CM

## 2024-06-08 RX ORDER — LEVOTHYROXINE SODIUM 0.1 MG/1
TABLET ORAL
Qty: 90 TABLET | Refills: 1 | Status: SHIPPED | OUTPATIENT
Start: 2024-06-08

## 2024-06-12 DIAGNOSIS — J40 BRONCHITIS: ICD-10-CM

## 2024-06-12 DIAGNOSIS — J45.909 UNCOMPLICATED ASTHMA, UNSPECIFIED ASTHMA SEVERITY, UNSPECIFIED WHETHER PERSISTENT: ICD-10-CM

## 2024-06-13 RX ORDER — ALBUTEROL SULFATE 90 UG/1
2 AEROSOL, METERED RESPIRATORY (INHALATION) EVERY 6 HOURS PRN
Qty: 8 G | Refills: 3 | Status: SHIPPED | OUTPATIENT
Start: 2024-06-13

## 2024-07-01 ENCOUNTER — ANNUAL EXAM (OUTPATIENT)
Dept: OBGYN CLINIC | Facility: MEDICAL CENTER | Age: 44
End: 2024-07-01
Payer: COMMERCIAL

## 2024-07-01 VITALS
DIASTOLIC BLOOD PRESSURE: 68 MMHG | BODY MASS INDEX: 32.14 KG/M2 | SYSTOLIC BLOOD PRESSURE: 124 MMHG | HEIGHT: 66 IN | WEIGHT: 200 LBS

## 2024-07-01 DIAGNOSIS — Z12.31 ENCOUNTER FOR SCREENING MAMMOGRAM FOR BREAST CANCER: Primary | ICD-10-CM

## 2024-07-01 DIAGNOSIS — Z12.39 ENCOUNTER FOR OTHER SCREENING FOR MALIGNANT NEOPLASM OF BREAST: ICD-10-CM

## 2024-07-01 PROCEDURE — 99396 PREV VISIT EST AGE 40-64: CPT | Performed by: CLINICAL NURSE SPECIALIST

## 2024-07-01 NOTE — PROGRESS NOTES
"Subjective:      Mary Schulz is a 43 y.o. female. Here for Gynecologic Exam (Pap 6/30/23 -/-/Birth control/Mammo 12/15/23)      GYN HPI  Menstrual cycle:  Patient denies menstrual complaints. Regular monthly menses, not excessive  Vaginal c/o: denies  Urinary c/o: denies  Breast complaints:denies  She does do self breast Exams    Sexually active: occasional. Same partner  Contraception: none  She reports she feels safe at home.     Dietary calcium/vit D  intake: Inadequate- counseled on intake  Lifestyle: sedentary     HEALTH MAINTENANCE SCREENINGS:    Last Papanicolaou test:  06/30/2023   History of abnormal pap: No  Last mammogram:  11/17/2023      Hereditary Cancer Screening  Cancer-related family history is not on file.     Substance Abuse Screening Completed. See hx and flowsheet.    The following portions of the patient's history were reviewed and updated as appropriate: allergies, current medications, past family history, past medical history, past social history, past surgical history, and problem list.         Review of Systems   Constitutional:  Negative for appetite change, chills, fatigue, fever and unexpected weight change.   HENT: Negative.     Eyes: Negative.    Respiratory:  Negative for chest tightness and shortness of breath.    Cardiovascular:  Negative for chest pain and palpitations.   Gastrointestinal:  Negative for abdominal pain, constipation and vomiting.   Endocrine: Negative for cold intolerance and heat intolerance.   Genitourinary:         As per HPI   Musculoskeletal:  Negative for back pain, joint swelling and neck pain.   Skin:  Negative for color change and rash.   Neurological:  Negative for dizziness, weakness and numbness.   Hematological:  Does not bruise/bleed easily.   Psychiatric/Behavioral: Negative.             Objective:  /68 (BP Location: Left arm, Patient Position: Sitting, Cuff Size: Large)   Ht 5' 6\" (1.676 m)   Wt 90.7 kg (200 lb)   LMP 06/15/2024 (Exact " Date)   BMI 32.28 kg/m²        Physical Exam  Constitutional:       General: She is not in acute distress.     Appearance: Normal appearance.   Genitourinary:      Vulva and rectum normal.      No lesions in the vagina.      Right Labia: No rash or lesions.     Left Labia: No lesions or rash.     No vaginal discharge, erythema, tenderness or bleeding.        Right Adnexa: not tender and no mass present.     Left Adnexa: not tender and no mass present.     No cervical motion tenderness, discharge or friability.      Uterus is not enlarged or tender.      No urethral prolapse present.      Pelvic exam was performed with patient in the lithotomy position.   Breasts:     Breasts are symmetrical.      Right: No inverted nipple, mass, nipple discharge, skin change or tenderness.      Left: No inverted nipple, mass, nipple discharge, skin change or tenderness.   HENT:      Head: Normocephalic and atraumatic.   Cardiovascular:      Rate and Rhythm: Normal rate.      Heart sounds: No murmur heard.  Pulmonary:      Effort: Pulmonary effort is normal.      Breath sounds: Normal breath sounds.   Abdominal:      General: There is no distension.      Palpations: Abdomen is soft.      Tenderness: There is no abdominal tenderness.   Musculoskeletal:         General: Normal range of motion.      Cervical back: Normal range of motion.   Lymphadenopathy:      Cervical: No cervical adenopathy.   Neurological:      Mental Status: She is alert and oriented to person, place, and time.   Skin:     General: Skin is warm and dry.   Psychiatric:         Mood and Affect: Mood normal.         Behavior: Behavior normal.   Vitals reviewed.             Assessment/Plan:           ANNUAL GYN EXAM- Primary  Annual GYN examination completed today.   Health maintenance reviewed/updated as appropriate  Cervical cancer screen: Previous pap smears and ASCCP screening guidelines have been reviewed. Pap not indicated today.  Breast Health: Encouraged  regular self breast exams. Mammo ordered.    Risk prevention and anticipatory guidance provided including:  Age related Calcium and vitamin D intake  Dietary and lifestyle recommendations based on her age and weight. body mass index is 32.28 kg/m²..    Tobacco and alcohol use, intervention ordered if applicable.   Condom use for prevention of STI's. declines STI Screening.  Contraception:   none    Problem List Items Addressed This Visit    None  Visit Diagnoses       Encounter for screening mammogram for breast cancer    -  Primary    Encounter for other screening for malignant neoplasm of breast        Relevant Orders    Mammo screening bilateral w 3d & cad            Orders Placed This Encounter   Procedures    Mammo screening bilateral w 3d & cad

## 2024-10-22 ENCOUNTER — OFFICE VISIT (OUTPATIENT)
Dept: FAMILY MEDICINE CLINIC | Facility: CLINIC | Age: 44
End: 2024-10-22
Payer: COMMERCIAL

## 2024-10-22 VITALS
WEIGHT: 208.6 LBS | HEIGHT: 66 IN | DIASTOLIC BLOOD PRESSURE: 70 MMHG | OXYGEN SATURATION: 99 % | HEART RATE: 104 BPM | TEMPERATURE: 98 F | SYSTOLIC BLOOD PRESSURE: 110 MMHG | BODY MASS INDEX: 33.52 KG/M2

## 2024-10-22 DIAGNOSIS — J40 BRONCHITIS: Primary | ICD-10-CM

## 2024-10-22 PROCEDURE — 99213 OFFICE O/P EST LOW 20 MIN: CPT | Performed by: FAMILY MEDICINE

## 2024-10-22 RX ORDER — BENZONATATE 100 MG/1
100 CAPSULE ORAL 3 TIMES DAILY PRN
Qty: 20 CAPSULE | Refills: 0 | Status: SHIPPED | OUTPATIENT
Start: 2024-10-22

## 2024-10-22 RX ORDER — AMOXICILLIN 500 MG/1
500 CAPSULE ORAL EVERY 8 HOURS SCHEDULED
Qty: 30 CAPSULE | Refills: 0 | Status: SHIPPED | OUTPATIENT
Start: 2024-10-22 | End: 2024-11-01

## 2024-10-22 NOTE — LETTER
October 22, 2024     Patient: Mary Schulz  YOB: 1980  Date of Visit: 10/22/2024      To Whom it May Concern:    Mary Schulz is under my professional care. Mary was seen in my office on 10/22/2024. Mary may return to work on 10/24/24 .    If you have any questions or concerns, please don't hesitate to call.         Sincerely,          Giuseppe Vergara MD        CC: No Recipients

## 2024-10-22 NOTE — PROGRESS NOTES
COVID-19 Outpatient Progress Note  Name: Mary Schulz      : 1980      MRN: [id number]  Encounter Provider: Giuseppe Vergara MD  Encounter Date: 10/22/2024   Encounter department: [facility]    Assessment & Plan  Bronchitis    Orders:    benzonatate (TESSALON PERLES) 100 mg capsule; Take 1 capsule (100 mg total) by mouth 3 (three) times a day as needed for cough    amoxicillin (AMOXIL) 500 mg capsule; Take 1 capsule (500 mg total) by mouth every 8 (eight) hours for 10 days    Disposition:     I have spent a total time of 15 minutes on the day of the encounter for this patient including       Depression Screening and Follow-up Plan: Patient was screened for depression during today's encounter. They screened negative with a PHQ-2 score of 0.        Encounter provider: Giuseppe Vergara MD     Provider located at: [address] [address] [phone]     Recent Visits  No visits were found meeting these conditions.  Showing recent visits within past 7 days and meeting all other requirements  Today's Visits  Date Type Provider Dept   10/22/24 Office Visit Giuseppe Vergara MD [facility]   Showing today's visits and meeting all other requirements  Future Appointments  No visits were found meeting these conditions.  Showing future appointments within next 150 days and meeting all other requirements    History of Present Illness      Subjective:   Mary Schulz is a 43 y.o. female who is concerned about COVID-19. Patient's symptoms include fever (101.8), fatigue, malaise, nasal congestion, rhinorrhea, sore throat, cough (worst symptom) and shortness of breath. Patient denies chills, anosmia, loss of taste, chest tightness, abdominal pain, nausea, vomiting, diarrhea, myalgias and headaches.     - Date of symptom onset: 10/20/2024      COVID-19 vaccination status: Fully vaccinated (primary series)    Exposure:   Contact with a  "person who is under investigation (PUI) for or who is positive for COVID-19 within the last 14 days?: No    Hospitalized recently for fever and/or lower respiratory symptoms?: No      Currently a healthcare worker that is involved in direct patient care?: No      Works in a special setting where the risk of COVID-19 transmission may be high? (this may include long-term care, correctional and FDC facilities; homeless shelters; assisted-living facilities and group homes.): No      Resident in a special setting where the risk of COVID-19 transmission may be high? (this may include long-term care, correctional and FDC facilities; homeless shelters; assisted-living facilities and group homes.): No      Works at a school  Decreased appetite  Some chest pain with coughing  Pt states some pressure in the left ear, improving  Home COVID-19 test negative     Lab Results   Component Value Date    SARSCOV2 Negative 01/24/2021       Review of Systems   Constitutional:  Positive for fatigue and fever (101.8). Negative for chills.   HENT:  Positive for congestion, ear pain, rhinorrhea, sore throat and trouble swallowing. Negative for drooling and ear discharge.    Respiratory:  Positive for cough (worst symptom) and shortness of breath. Negative for chest tightness and stridor.    Gastrointestinal:  Negative for abdominal pain, diarrhea, nausea and vomiting.   Musculoskeletal:  Negative for myalgias and neck pain.   Neurological:  Negative for headaches.     Objective     /70 (BP Location: Right arm, Patient Position: Sitting, Cuff Size: Large)   Pulse 104   Temp 98 °F (36.7 °C) (Temporal)   Ht 5' 6\" (1.676 m)   Wt 94.6 kg (208 lb 9.6 oz)   LMP 10/22/2024 (Exact Date)   SpO2 99%   Breastfeeding No   BMI 33.67 kg/m²     Physical Exam      Giuseppe Vergara M.D.  Family Medicine    Please excuse any \"sound-alike\" errors that may have ocurred during the process of dictation. Parts of this note have been dictated " and there may be errors present in the transcription process. Thank you.

## 2024-10-22 NOTE — ASSESSMENT & PLAN NOTE
Orders:    benzonatate (TESSALON PERLES) 100 mg capsule; Take 1 capsule (100 mg total) by mouth 3 (three) times a day as needed for cough    amoxicillin (AMOXIL) 500 mg capsule; Take 1 capsule (500 mg total) by mouth every 8 (eight) hours for 10 days

## 2024-11-27 ENCOUNTER — HOSPITAL ENCOUNTER (OUTPATIENT)
Dept: RADIOLOGY | Facility: MEDICAL CENTER | Age: 44
Discharge: HOME | End: 2024-11-27
Payer: COMMERCIAL

## 2024-11-27 VITALS — BODY MASS INDEX: 33.43 KG/M2 | HEIGHT: 66 IN | WEIGHT: 208 LBS

## 2024-11-27 DIAGNOSIS — Z12.39 ENCOUNTER FOR OTHER SCREENING FOR MALIGNANT NEOPLASM OF BREAST: ICD-10-CM

## 2024-11-27 PROCEDURE — 77063 BREAST TOMOSYNTHESIS BI: CPT

## 2024-11-27 PROCEDURE — 77067 SCR MAMMO BI INCL CAD: CPT

## 2024-12-06 ENCOUNTER — RESULTS FOLLOW-UP (OUTPATIENT)
Dept: OBGYN CLINIC | Facility: MEDICAL CENTER | Age: 44
End: 2024-12-06

## 2024-12-07 DIAGNOSIS — E03.9 ACQUIRED HYPOTHYROIDISM: ICD-10-CM

## 2024-12-08 DIAGNOSIS — J40 BRONCHITIS: ICD-10-CM

## 2024-12-08 DIAGNOSIS — J45.909 UNCOMPLICATED ASTHMA, UNSPECIFIED ASTHMA SEVERITY, UNSPECIFIED WHETHER PERSISTENT: ICD-10-CM

## 2024-12-09 RX ORDER — LEVOTHYROXINE SODIUM 100 UG/1
100 TABLET ORAL DAILY
Qty: 30 TABLET | Refills: 0 | Status: SHIPPED | OUTPATIENT
Start: 2024-12-09

## 2024-12-10 RX ORDER — ALBUTEROL SULFATE 90 UG/1
2 INHALANT RESPIRATORY (INHALATION) EVERY 6 HOURS PRN
Qty: 8 G | Refills: 2 | Status: SHIPPED | OUTPATIENT
Start: 2024-12-10

## 2025-01-08 DIAGNOSIS — E03.9 ACQUIRED HYPOTHYROIDISM: ICD-10-CM

## 2025-01-08 RX ORDER — LEVOTHYROXINE SODIUM 100 UG/1
100 TABLET ORAL DAILY
Qty: 90 TABLET | Refills: 0 | Status: SHIPPED | OUTPATIENT
Start: 2025-01-08

## 2025-01-14 ENCOUNTER — OFFICE VISIT (OUTPATIENT)
Dept: FAMILY MEDICINE CLINIC | Facility: CLINIC | Age: 45
End: 2025-01-14
Payer: COMMERCIAL

## 2025-01-14 VITALS
DIASTOLIC BLOOD PRESSURE: 78 MMHG | OXYGEN SATURATION: 98 % | HEART RATE: 93 BPM | SYSTOLIC BLOOD PRESSURE: 118 MMHG | BODY MASS INDEX: 33.75 KG/M2 | WEIGHT: 210 LBS | HEIGHT: 66 IN | TEMPERATURE: 97.9 F

## 2025-01-14 DIAGNOSIS — H65.03 NON-RECURRENT ACUTE SEROUS OTITIS MEDIA OF BOTH EARS: Primary | ICD-10-CM

## 2025-01-14 PROCEDURE — 99213 OFFICE O/P EST LOW 20 MIN: CPT | Performed by: FAMILY MEDICINE

## 2025-01-14 RX ORDER — LORATADINE 10 MG/1
10 TABLET ORAL DAILY
COMMUNITY

## 2025-01-14 RX ORDER — AMOXICILLIN 500 MG/1
500 CAPSULE ORAL EVERY 12 HOURS SCHEDULED
Qty: 14 CAPSULE | Refills: 0 | Status: SHIPPED | OUTPATIENT
Start: 2025-01-14 | End: 2025-01-21

## 2025-01-14 NOTE — LETTER
January 14, 2025     Patient: Mary Schulz  YOB: 1980  Date of Visit: 1/14/2025      To Whom it May Concern:    Mary Schulz is under my professional care. Mary was seen in my office on 1/14/2025. Mary may return to work on 1/15/25 .    If you have any questions or concerns, please don't hesitate to call.         Sincerely,          Giuseppe Vergara MD        CC: No Recipients

## 2025-01-14 NOTE — PROGRESS NOTES
Outpatient Progress Note  Name: Mary Schulz      : 1980      MRN: [id number]  Encounter Provider: Giuseppe Vergara MD  Encounter Date: 2025   Encounter department: [facility]    Assessment & Plan  Non-recurrent acute serous otitis media of both ears    Orders:    amoxicillin (AMOXIL) 500 mg capsule; Take 1 capsule (500 mg total) by mouth every 12 (twelve) hours for 7 days    Disposition:     I have spent a total time of 15 minutes on the day of the encounter for this patient including          Encounter provider: Giuseppe Vergara MD     Provider located at: [address]  [facility] [address] [phone]     Recent Visits  No visits were found meeting these conditions.  Showing recent visits within past 7 days and meeting all other requirements  Today's Visits  Date Type Provider Dept   25 Office Visit Giuseppe Vergara MD [facility]   Showing today's visits and meeting all other requirements  Future Appointments  No visits were found meeting these conditions.  Showing future appointments within next 150 days and meeting all other requirements    History of Present Illness      Subjective:   Mary Schulz is a 44 y.o. female who is concerned about COVID-19. Patient's symptoms include fever (100.7), fatigue, sore throat and diarrhea. Patient denies chills, malaise, congestion, rhinorrhea, anosmia, loss of taste, cough, shortness of breath, chest tightness, abdominal pain, nausea, vomiting, myalgias and headaches.     - Date of symptom onset: 2025      COVID-19 vaccination status: Fully vaccinated (primary series)    Exposure:   Contact with a person who is under investigation (PUI) for or who is positive for COVID-19 within the last 14 days?: No    Hospitalized recently for fever and/or lower respiratory symptoms?: No      Currently a healthcare worker that is involved in direct patient care?: No      Works in a  "special setting where the risk of COVID-19 transmission may be high? (this may include long-term care, correctional and long-term facilities; homeless shelters; assisted-living facilities and group homes.): No      Resident in a special setting where the risk of COVID-19 transmission may be high? (this may include long-term care, correctional and long-term facilities; homeless shelters; assisted-living facilities and group homes.): No      Ear pain with swallow  Drooling while sleeping   Pt is a     Lab Results   Component Value Date    SARSCOV2 Negative 01/24/2021       Review of Systems   Constitutional:  Positive for fatigue and fever (100.7). Negative for chills.   HENT:  Positive for ear pain, sore throat and trouble swallowing. Negative for congestion, drooling, ear discharge and rhinorrhea.    Respiratory:  Negative for cough, chest tightness, shortness of breath and stridor.    Gastrointestinal:  Positive for diarrhea. Negative for abdominal pain, nausea and vomiting.   Musculoskeletal:  Negative for myalgias and neck pain.   Neurological:  Negative for headaches.     Objective   /78 (BP Location: Right arm, Patient Position: Sitting, Cuff Size: Large)   Pulse 93   Temp 97.9 °F (36.6 °C) (Temporal)   Ht 5' 6\" (1.676 m)   Wt 95.3 kg (210 lb)   LMP 12/27/2024 (Exact Date)   SpO2 98%   BMI 33.89 kg/m²     Physical Exam  Vitals reviewed.   Constitutional:       General: She is not in acute distress.     Appearance: Normal appearance. She is not ill-appearing, toxic-appearing or diaphoretic.   HENT:      Head: Normocephalic.      Ears:      Comments: Limited view, non erythematous       Nose: Congestion present.      Mouth/Throat:      Mouth: Mucous membranes are moist.   Cardiovascular:      Rate and Rhythm: Normal rate.      Pulses: Normal pulses.   Pulmonary:      Effort: Pulmonary effort is normal.   Abdominal:      General: Abdomen is flat.   Musculoskeletal:         General: No " "swelling or deformity.   Skin:     General: Skin is warm and dry.      Capillary Refill: Capillary refill takes less than 2 seconds.      Coloration: Skin is not jaundiced.   Neurological:      General: No focal deficit present.      Mental Status: She is alert.   Psychiatric:         Mood and Affect: Mood normal.         Giuseppe Vergara M.D.  Family Medicine    Please excuse any \"sound-alike\" errors that may have ocurred during the process of dictation. Parts of this note have been dictated and there may be errors present in the transcription process. Thank you.    Answers submitted by the patient for this visit:  Sore Throat Questionnaire (Submitted on 1/13/2025)  Chief Complaint: Sore throat  Chronicity: new  Onset: yesterday  Progression since onset: rapidly worsening  Pain worse on: neither  Fever: 100 - 100.9 F  Fever duration: 1 to 2 days  pain severity now: moderate  Pain - numeric: 8/10  hoarse voice: No  plugged ear sensation: No  swollen glands: Yes    "

## 2025-02-17 ENCOUNTER — OFFICE VISIT (OUTPATIENT)
Dept: FAMILY MEDICINE CLINIC | Facility: CLINIC | Age: 45
End: 2025-02-17
Payer: COMMERCIAL

## 2025-02-17 VITALS
SYSTOLIC BLOOD PRESSURE: 112 MMHG | OXYGEN SATURATION: 98 % | DIASTOLIC BLOOD PRESSURE: 76 MMHG | HEIGHT: 66 IN | BODY MASS INDEX: 34.49 KG/M2 | TEMPERATURE: 97.6 F | HEART RATE: 76 BPM | WEIGHT: 214.6 LBS

## 2025-02-17 DIAGNOSIS — E03.9 ACQUIRED HYPOTHYROIDISM: ICD-10-CM

## 2025-02-17 DIAGNOSIS — F41.9 ANXIETY: ICD-10-CM

## 2025-02-17 DIAGNOSIS — Z13.0 SCREENING FOR DEFICIENCY ANEMIA: ICD-10-CM

## 2025-02-17 DIAGNOSIS — Z00.00 ANNUAL PHYSICAL EXAM: Primary | ICD-10-CM

## 2025-02-17 DIAGNOSIS — R68.82 DECREASED LIBIDO: ICD-10-CM

## 2025-02-17 DIAGNOSIS — Z13.220 SCREENING CHOLESTEROL LEVEL: ICD-10-CM

## 2025-02-17 PROCEDURE — 99396 PREV VISIT EST AGE 40-64: CPT | Performed by: FAMILY MEDICINE

## 2025-02-17 NOTE — PROGRESS NOTES
Adult Annual Physical  Name: Mary Schulz      : 1980      MRN: [id number]  Encounter Provider: Giuseppe Vergara MD  Encounter Date: 2025   Encounter department: [facility]    Assessment & Plan  Annual physical exam  Physical complete at this time, discussed lifestyle modification to help with weight loss  Patient does have some symptoms relating to difficulty with motivation  Will monitor for worsening depression/ADHD type symptoms  Slight increase in caffeine intake may help with symptoms  Better planning/organization of tasks may help      Orders:    CBC and differential; Future    Comprehensive metabolic panel; Future    TSH, 3rd generation with Free T4 reflex; Future    Lipid panel; Future    Acquired hypothyroidism    Orders:    TSH, 3rd generation with Free T4 reflex; Future    Anxiety       Decreased libido    Orders:    CBC and differential; Future    Comprehensive metabolic panel; Future    Screening cholesterol level    Orders:    Lipid panel; Future    Screening for deficiency anemia    Orders:    CBC and differential; Future      Immunizations and preventive care screenings were discussed with patient today. Appropriate education was printed on patient's after visit summary.    Counseling:  Alcohol/drug use: discussed moderation in alcohol intake, the recommendations for healthy alcohol use, and avoidance of illicit drug use.  Dental Health: discussed importance of regular tooth brushing, flossing, and dental visits.  Injury prevention: discussed safety/seat belts, safety helmets, smoke detectors, carbon monoxide detectors, and smoking near bedding or upholstery.  Sexual health: discussed sexually transmitted diseases, partner selection, use of condoms, avoidance of unintended pregnancy, and contraceptive alternatives.  Exercise: the importance of regular exercise/physical activity was discussed. Recommend exercise 3-5 times per week for at least 30 minutes.       "    History of Present Illness       Adult Annual Physical:  Patient presents for annual physical. Pt here for annual physical.     Diet and Physical Activity:  - Diet/Nutrition: poor diet. needs to improve fruits and vegetable intake, portion control  - Exercise: no formal exercise.    Depression Screening:  - PHQ-2 Score: 2    General Health:  - Sleep: sleeps well. rare but some trouble with staying asleep at night  - Hearing: normal hearing bilateral ears.  - Vision: no vision problems.  - Dental: regular dental visits and brushes teeth twice daily.    /GYN Health:  - Follows with GYN: yes.   - Menopause: premenopausal.   - History of STDs: no      Review of Systems   Constitutional:  Negative for chills and fever.   HENT:  Negative for congestion.    Respiratory:  Negative for chest tightness and shortness of breath.    Cardiovascular:  Negative for chest pain.   Gastrointestinal:  Negative for abdominal pain, constipation, diarrhea, nausea and vomiting.   Neurological:  Negative for dizziness, light-headedness and headaches.   Psychiatric/Behavioral:  Negative for sleep disturbance.         Feels like she might need more to help with motivation          Objective   /76 (BP Location: Right arm, Patient Position: Sitting, Cuff Size: Standard)   Pulse 76   Temp 97.6 °F (36.4 °C)   Ht 5' 6\" (1.676 m)   Wt 97.3 kg (214 lb 9.6 oz)   SpO2 98%   Breastfeeding No   BMI 34.64 kg/m²     Physical Exam  Vitals reviewed.   Constitutional:       General: She is not in acute distress.     Appearance: Normal appearance. She is obese. She is not ill-appearing, toxic-appearing or diaphoretic.   HENT:      Head: Normocephalic.      Right Ear: There is no impacted cerumen.      Nose: Nose normal. No congestion.      Mouth/Throat:      Mouth: Mucous membranes are moist.   Eyes:      General:         Right eye: No discharge.      Extraocular Movements: Extraocular movements intact.      Pupils: Pupils are equal, round, " "and reactive to light.   Cardiovascular:      Rate and Rhythm: Normal rate.      Pulses: Normal pulses.   Pulmonary:      Effort: Pulmonary effort is normal.   Musculoskeletal:         General: No swelling or deformity.   Skin:     General: Skin is warm and dry.      Capillary Refill: Capillary refill takes less than 2 seconds.      Coloration: Skin is not jaundiced.   Neurological:      General: No focal deficit present.      Mental Status: She is alert.   Psychiatric:         Mood and Affect: Mood normal.         Giuseppe Vergara M.D.  Family Medicine    Please excuse any \"sound-alike\" errors that may have ocurred during the process of dictation. Parts of this note have been dictated and there may be errors present in the transcription process. Thank you.    "

## 2025-02-17 NOTE — PATIENT INSTRUCTIONS
"Patient Education     Routine physical for adults   The Basics   Written by the doctors and editors at [facility]   What is a physical? -- A physical is a routine visit, or \"check-up,\" with your doctor. You might also hear it called a \"wellness visit\" or \"preventive visit.\"  During each visit, the doctor will:   Ask about your physical and mental health   Ask about your habits, behaviors, and lifestyle   Do an exam   Give you vaccines if needed   Talk to you about any medicines you take   Give advice about your health   Answer your questions  Getting regular check-ups is an important part of taking care of your health. It can help your doctor find and treat any problems you have. But it's also important for preventing health problems.  A routine physical is different from a \"sick visit.\" A sick visit is when you see a doctor because of a health concern or problem. Since physicals are scheduled ahead of time, you can think about what you want to ask the doctor.  How often should I get a physical? -- It depends on your age and health. In general, for people age 21 years and older:   If you are younger than 50 years, you might be able to get a physical every 3 years.   If you are 50 years or older, your doctor might recommend a physical every year.  If you have an ongoing health condition, like diabetes or high blood pressure, your doctor will probably want to see you more often.  What happens during a physical? -- In general, each visit will include:   Physical exam - The doctor or nurse will check your height, weight, heart rate, and blood pressure. They will also look at your eyes and ears. They will ask about how you are feeling and whether you have any symptoms that bother you.   Medicines - It's a good idea to bring a list of all the medicines you take to each doctor visit. Your doctor will talk to you about your medicines and answer any questions. Tell them if you are having any side effects that bother you. You " "should also tell them if you are having trouble paying for any of your medicines.   Habits and behaviors - This includes:   Your diet   Your exercise habits   Whether you smoke, drink alcohol, or use drugs   Whether you are sexually active   Whether you feel safe at home  Your doctor will talk to you about things you can do to improve your health and lower your risk of health problems. They will also offer help and support. For example, if you want to quit smoking, they can give you advice and might prescribe medicines. If you want to improve your diet or get more physical activity, they can help you with this, too.   Lab tests, if needed - The tests you get will depend on your age and situation. For example, your doctor might want to check your:   Cholesterol   Blood sugar   Iron level   Vaccines - The recommended vaccines will depend on your age, health, and what vaccines you already had. Vaccines are very important because they can prevent certain serious or deadly infections.   Discussion of screening - \"Screening\" means checking for diseases or other health problems before they cause symptoms. Your doctor can recommend screening based on your age, risk, and preferences. This might include tests to check for:   Cancer, such as breast, prostate, cervical, ovarian, colorectal, prostate, lung, or skin cancer   Sexually transmitted infections, such as chlamydia and gonorrhea   Mental health conditions like depression and anxiety  Your doctor will talk to you about the different types of screening tests. They can help you decide which screenings to have. They can also explain what the results might mean.   Answering questions - The physical is a good time to ask the doctor or nurse questions about your health. If needed, they can refer you to other doctors or specialists, too.  Adults older than 65 years often need other care, too. As you get older, your doctor will talk to you about:   How to prevent falling at " home   Hearing or vision tests   Memory testing   How to take your medicines safely   Making sure that you have the help and support you need at home  All topics are updated as new evidence becomes available and our peer review process is complete.  This topic retrieved from [employer] on: May 02, 2024.  Topic 160177 Version 1.0  Release: 32.4.3 - C32.122  © 2024 UpToDate, Inc. and/or its affiliates. All rights reserved.  Consumer Information Use and Disclaimer   Disclaimer: This generalized information is a limited summary of diagnosis, treatment, and/or medication information. It is not meant to be comprehensive and should be used as a tool to help the user understand and/or assess potential diagnostic and treatment options. It does NOT include all information about conditions, treatments, medications, side effects, or risks that may apply to a specific patient. It is not intended to be medical advice or a substitute for the medical advice, diagnosis, or treatment of a health care provider based on the health care provider's examination and assessment of a patient's specific and unique circumstances. Patients must speak with a health care provider for complete information about their health, medical questions, and treatment options, including any risks or benefits regarding use of medications. This information does not endorse any treatments or medications as safe, effective, or approved for treating a specific patient. UpToDate, Inc. and its affiliates disclaim any warranty or liability relating to this information or the use thereof.The use of this information is governed by the Terms of Use, available at https://www.woltersOnTheRoaduwer.com/en/know/clinical-effectiveness-terms. 2024© UpToDate, Inc. and its affiliates and/or licensors. All rights reserved.  Copyright   © 2024 UpToDate, Inc. and/or its affiliates. All rights reserved.

## 2025-04-06 DIAGNOSIS — E03.9 ACQUIRED HYPOTHYROIDISM: ICD-10-CM

## 2025-04-07 RX ORDER — LEVOTHYROXINE SODIUM 100 UG/1
100 TABLET ORAL DAILY
Qty: 90 TABLET | Refills: 0 | Status: SHIPPED | OUTPATIENT
Start: 2025-04-07

## 2025-04-07 NOTE — TELEPHONE ENCOUNTER
Courtesy refill previously given 01.08.2025, patient need updated blood work and have orders placed 02.2025   Please review to see if the refill is appropriate.

## 2025-04-10 DIAGNOSIS — J45.909 UNCOMPLICATED ASTHMA, UNSPECIFIED ASTHMA SEVERITY, UNSPECIFIED WHETHER PERSISTENT: ICD-10-CM

## 2025-04-10 DIAGNOSIS — J40 BRONCHITIS: ICD-10-CM

## 2025-04-11 RX ORDER — ALBUTEROL SULFATE 90 UG/1
2 INHALANT RESPIRATORY (INHALATION) EVERY 6 HOURS PRN
Qty: 8 G | Refills: 2 | Status: SHIPPED | OUTPATIENT
Start: 2025-04-11

## 2025-05-27 ENCOUNTER — OFFICE VISIT (OUTPATIENT)
Dept: FAMILY MEDICINE CLINIC | Facility: CLINIC | Age: 45
End: 2025-05-27
Payer: COMMERCIAL

## 2025-05-27 VITALS
BODY MASS INDEX: 34.84 KG/M2 | TEMPERATURE: 98.1 F | HEIGHT: 66 IN | OXYGEN SATURATION: 98 % | SYSTOLIC BLOOD PRESSURE: 110 MMHG | DIASTOLIC BLOOD PRESSURE: 72 MMHG | WEIGHT: 216.8 LBS | HEART RATE: 91 BPM

## 2025-05-27 DIAGNOSIS — J45.909 UNCOMPLICATED ASTHMA, UNSPECIFIED ASTHMA SEVERITY, UNSPECIFIED WHETHER PERSISTENT: Primary | ICD-10-CM

## 2025-05-27 PROCEDURE — 99214 OFFICE O/P EST MOD 30 MIN: CPT | Performed by: FAMILY MEDICINE

## 2025-05-27 RX ORDER — MONTELUKAST SODIUM 10 MG/1
10 TABLET ORAL
Qty: 90 TABLET | Refills: 1 | Status: SHIPPED | OUTPATIENT
Start: 2025-05-27

## 2025-05-27 RX ORDER — BUDESONIDE AND FORMOTEROL FUMARATE DIHYDRATE 80; 4.5 UG/1; UG/1
2 AEROSOL RESPIRATORY (INHALATION) 2 TIMES DAILY
Qty: 10.2 G | Refills: 1 | Status: SHIPPED | OUTPATIENT
Start: 2025-05-27

## 2025-05-27 NOTE — ASSESSMENT & PLAN NOTE
Patient was worsening asthma symptoms, using rescue Hailer almost on a daily basis  Will patient try Symbicort inhaler 2 puffs twice a day to optimize asthma control  Goal of requiring albuterol inhaler less than once every 2 weeks  If Symbicort by itself is not sufficient patient may also try Singulair together at evening to help limit asthma symptoms  With deep cleaning please use mask to avoid exposure    Orders:    montelukast (SINGULAIR) 10 mg tablet; Take 1 tablet (10 mg total) by mouth daily at bedtime    budesonide-formoterol (Symbicort) 80-4.5 MCG/ACT inhaler; Inhale 2 puffs 2 (two) times a day Rinse mouth after use.

## 2025-05-27 NOTE — PROGRESS NOTES
Name: Mary Schulz      : 1980      MRN: [id number]  Encounter Provider: Giuseppe Vergara MD  Encounter Date: 2025   Encounter department: [facility]    Assessment & Plan  Uncomplicated asthma, unspecified asthma severity, unspecified whether persistent    Patient was worsening asthma symptoms, using rescue Hailer almost on a daily basis  Will patient try Symbicort inhaler 2 puffs twice a day to optimize asthma control  Goal of requiring albuterol inhaler less than once every 2 weeks  If Symbicort by itself is not sufficient patient may also try Singulair together at evening to help limit asthma symptoms  With deep cleaning please use mask to avoid exposure    Orders:    montelukast (SINGULAIR) 10 mg tablet; Take 1 tablet (10 mg total) by mouth daily at bedtime    budesonide-formoterol (Symbicort) 80-4.5 MCG/ACT inhaler; Inhale 2 puffs 2 (two) times a day Rinse mouth after use.         History of Present Illness       Asthma  She complains of shortness of breath and wheezing. Associated symptoms include a sore throat. Pertinent negatives include no chest pain, ear pain, fever, headaches or rhinorrhea. Her past medical history is significant for asthma.   Shortness of Breath  The current episode started more than 1 year ago. The problem occurs daily. The problem has been waxing and waning since onset. Associated symptoms include orthopnea, a sore throat and wheezing. Pertinent negatives include no chest pain, leg swelling or rhinorrhea. The symptoms are aggravated by emotional upset, smoke, animal exposure, exercise, odors, URIs, fumes, pollens and weather changes. Her past medical history is significant for asthma.       Mary is a 44 year old female presents for eval regarding worsening asthma symptom.   Anytime the dust gets kicked up my asthma gets really bad. Patient reports when she   Patient does own cats, states she has some mild reaction, mild wheezing when interacting  "with them.   Patient states she co-worker informed her that nebulized albuterol help her more than the inhaler.   Patient uses albuterol inhaler usually at least once a day.   Patient has not tried an other inhalers than albuterol.     Review of Systems   Constitutional:  Negative for chills and fever.   HENT:  Positive for sore throat. Negative for ear pain and rhinorrhea.    Respiratory:  Positive for shortness of breath and wheezing.    Cardiovascular:  Positive for orthopnea. Negative for chest pain and leg swelling.   Gastrointestinal:  Negative for abdominal pain and vomiting.   Musculoskeletal:  Negative for neck pain.   Skin:  Negative for rash.   Allergic/Immunologic: Negative for environmental allergies.   Neurological:  Negative for headaches.   Psychiatric/Behavioral:  Negative for sleep disturbance.          Past Medical History[1]  Past Surgical History[2]  Family History[3]  Social History[4]  Medications[5]  Allergies   Allergen Reactions    Milk (Cow) GI Intolerance    Pollen Extract Sneezing     Immunization History   Administered Date(s) Administered    COVID-19 PFIZER VACCINE 0.3 ML IM 03/19/2021, 04/09/2021    INFLUENZA 10/25/2017, 11/08/2018    Influenza Quadrivalent, 6-35 Months IM 10/25/2017    Influenza, injectable, quadrivalent, preservative free 0.5 mL 11/08/2018, 10/02/2019    Pneumococcal Polysaccharide PPV23 05/27/2015    Tdap 04/02/2015    Tuberculin Skin Test-PPD Intradermal 05/22/2023     Objective   /72 (BP Location: Right arm, Patient Position: Sitting, Cuff Size: Large)   Pulse 91   Temp 98.1 °F (36.7 °C) (Temporal)   Ht 5' 6\" (1.676 m)   Wt 98.3 kg (216 lb 12.8 oz)   LMP 05/08/2025 (Approximate)   SpO2 98%   BMI 34.99 kg/m²     Physical Exam  Vitals reviewed.   Constitutional:       General: She is not in acute distress.     Appearance: Normal appearance. She is not ill-appearing, toxic-appearing or diaphoretic.     Cardiovascular:      Rate and Rhythm: Normal rate. " "     Pulses: Normal pulses.   Pulmonary:      Effort: Pulmonary effort is normal.   Abdominal:      General: Abdomen is flat.     Musculoskeletal:         General: No swelling or deformity.     Skin:     General: Skin is warm and dry.      Capillary Refill: Capillary refill takes less than 2 seconds.      Coloration: Skin is not jaundiced.     Neurological:      General: No focal deficit present.      Mental Status: She is alert and oriented to person, place, and time.     Psychiatric:         Mood and Affect: Mood normal.         Giuseppe Vergara M.D.  Family Medicine    Please excuse any \"sound-alike\" errors that may have ocurred during the process of dictation. Parts of this note have been dictated and there may be errors present in the transcription process. Thank you.           [1]   Past Medical History:  Diagnosis Date    Allergic     Anxiety     Asthma Unsure    Bladder incontinence     last assessed: 2014    Disease of thyroid gland     GERD (gastroesophageal reflux disease)     Headache(784.0)     Hypothyroidism Unsure    Mental disorder     Obesity     Otitis media     Pap smear abnormality of cervix with ASCUS favoring benign 10/29/2014    HPV neg    Skin tag Unsure    Urinary tract infection     Varicella The 80’s   [2]   Past Surgical History:  Procedure Laterality Date    DILATION AND EVACUATION      surgically induced  @ 7 wks   [3]   Family History  Problem Relation Name Age of Onset    Alcohol abuse Mother      Mental illness Mother          mental disorder    Substance Abuse Mother      Other Mother          urinary incontinence    Alcohol abuse Father Jamie     Mental illness Father Jamie         mental disorder    Substance Abuse Father Jamie     Other Sister Tonia Beauchamp         urinary incontinence    Mental illness Sister Tonia Beauchamp     Depression Sister Tonia Beauchamp     Self-Injury Sister Tonia Beauchamp     Suicide Attempts Sister Tonia Beauchamp     Asthma Sister Tonia " Beauchamp     Anxiety disorder Sister Tonia Beauchamp     No Known Problems Sister      No Known Problems Sister      No Known Problems Sister      No Known Problems Daughter      Arthritis Maternal Grandmother Chikis     Hypertension Maternal Grandmother Chikis     Osteoporosis Maternal Grandmother Chikis     Kidney disease Maternal Grandmother Chikis         renal disease    Heart disease Maternal Grandmother Chikis     Thyroid disease Maternal Grandmother Chikis     Osteoarthritis Maternal Grandmother Chikis     Diabetes Maternal Grandfather Jose Mccarthy     Heart attack Maternal Grandfather Jose Mccarthy     Alcohol abuse Maternal Grandfather Jose Mccarthy             No Known Problems Paternal Grandmother      Alcohol abuse Paternal Grandfather Jamie Sr.             Anemia Maternal Aunt      No Known Problems Maternal Aunt      No Known Problems Maternal Aunt      Anemia Paternal Aunt Alexander     Varicose Veins Paternal Aunt Alexander         of lower extremities    Mental illness Paternal Aunt Alexander         Anxiety    Anxiety disorder Paternal Aunt Alexander     No Known Problems Paternal Aunt      No Known Problems Maternal Uncle      No Known Problems Maternal Uncle      No Known Problems Maternal Uncle      No Known Problems Paternal Uncle      No Known Problems Paternal Uncle      Anxiety disorder Cousin Flor     Anxiety disorder Cousin Angela     Drug abuse Cousin Lui     Alcohol abuse Paternal Uncle Haja         Clean    Mental illness Paternal Uncle Haja     Alcohol abuse Paternal Uncle Mehran         Clean    Drug abuse Maternal Aunt Macie         Clean    Substance Abuse Maternal Aunt Macie         Clean    Bipolar disorder Maternal Aunt Macie     Drug abuse Maternal Aunt Lore         Clean    Mental illness Maternal Aunt Lore     Substance Abuse Maternal Aunt Lore         Clean    Anxiety disorder Maternal Aunt Lore     Drug abuse Maternal Aunt Terese         Clean    Mental illness Maternal Aunt Terese      Substance Abuse Maternal Aunt Terese         Clean    Anxiety disorder Maternal Aunt Terese     Drug abuse Maternal Uncle Raza     Mental illness Maternal Uncle Raza     Substance Abuse Maternal Uncle Raza     Drug abuse Maternal Uncle Shar             Mental illness Maternal Uncle Shar     Substance Abuse Maternal Uncle Shar             Mental illness Sister Shani         Anxiety    Anxiety disorder Sister Shani     Mental illness Sister Vance         Anxiety    Anxiety disorder Sister Vance    [4]   Social History  Tobacco Use    Smoking status: Former     Current packs/day: 0.00     Average packs/day: 0.5 packs/day for 19.0 years (9.5 ttl pk-yrs)     Types: Cigarettes     Start date:      Quit date: 2020     Years since quittin.4     Passive exposure: Past    Smokeless tobacco: Never   Vaping Use    Vaping status: Never Used   Substance and Sexual Activity    Alcohol use: Not Currently    Drug use: Never    Sexual activity: Not Currently     Partners: Male     Birth control/protection: None   [5]   Current Outpatient Medications on File Prior to Visit   Medication Sig    albuterol (ProAir HFA) 90 mcg/act inhaler Inhale 2 puffs every 6 (six) hours as needed for wheezing    Cetirizine HCl 10 MG CAPS Take 1 capsule by mouth daily    levothyroxine 100 mcg tablet TAKE 1 TABLET BY MOUTH EVERY DAY    multivitamin (THERAGRAN) TABS Take 1 tablet by mouth in the morning.    benzonatate (TESSALON PERLES) 100 mg capsule Take 1 capsule (100 mg total) by mouth 3 (three) times a day as needed for cough    loratadine (CLARITIN) 10 mg tablet Take 10 mg by mouth daily (Patient not taking: Reported on 2025)

## 2025-07-02 ENCOUNTER — ANNUAL EXAM (OUTPATIENT)
Dept: OBGYN CLINIC | Facility: MEDICAL CENTER | Age: 45
End: 2025-07-02
Payer: COMMERCIAL

## 2025-07-02 VITALS
HEIGHT: 66 IN | WEIGHT: 215.6 LBS | BODY MASS INDEX: 34.65 KG/M2 | DIASTOLIC BLOOD PRESSURE: 78 MMHG | SYSTOLIC BLOOD PRESSURE: 116 MMHG

## 2025-07-02 DIAGNOSIS — N94.6 DYSMENORRHEA: ICD-10-CM

## 2025-07-02 DIAGNOSIS — Z12.31 ENCOUNTER FOR SCREENING MAMMOGRAM FOR BREAST CANCER: ICD-10-CM

## 2025-07-02 DIAGNOSIS — N95.1 PERIMENOPAUSE: ICD-10-CM

## 2025-07-02 DIAGNOSIS — Z01.419 ENCOUNTER FOR GYNECOLOGICAL EXAMINATION (GENERAL) (ROUTINE) WITHOUT ABNORMAL FINDINGS: Primary | ICD-10-CM

## 2025-07-02 PROCEDURE — S0612 ANNUAL GYNECOLOGICAL EXAMINA: HCPCS | Performed by: CLINICAL NURSE SPECIALIST

## 2025-07-02 NOTE — PROGRESS NOTES
Name: Mary Schulz      : 1980      MRN: [id number]  Encounter Provider: FLAKITA Spicer  Encounter Date: 2025   Encounter department: [facility]    :  Assessment & Plan  Encounter for gynecological examination (general) (routine) without abnormal findings  Annual GYN examination completed today.   Health maintenance reviewed/updated as appropriate  Risk prevention and anticipatory guidance provided including:  Encouraged regular self breast exams and to call with changes   Age related Calcium and vitamin D intake  Dietary and lifestyle recommendations based on her age and weight. body mass index is 34.8 kg/m²..    Tobacco and alcohol use, intervention ordered if applicable.   Condom use for prevention of STI's.       Encounter for screening mammogram for breast cancer    Orders:  •  Mammo screening bilateral w 3d and cad; Future    Dysmenorrhea  Pt c/o increased cramping that is new for her. Periods mildly irregular. Will check US for evloving fibroids or adenomyosis changes.   Orders:  •  US pelvis complete w transvaginal; Future    Perimenopause  Likely starting perimenopause. Periods getting more irregular, + mood lability,  palpitations (only this last month) and low libido. . Agree seems likely.  F/u with PCP if Palpitations more bothersome. If desires to discuss tx like MHT to schedule a f/u appt                   Subjective:      History of Present Illness     Mary Schulz is a 44 y.o. female. Here for Gynecologic Exam (Yearly exam //-pap: 2023 NILM HPV neg /Gardasil series never completed. /-mammogram: 2024 BIRADs 2 benign; Order placed. /-colonoscopy: Due at 46/-LMP: 2025 periods last 7 days. Cycles are every 25 days. /BC: None. /Not currently sexually active. Declines STD testing. /No family history of uterine, ovarian, cervical or breast cancer. )      Patient reports irregular menstrual pattern, thinks she  is starting perimenopuase. Cramps are bit worse which is new now has to take OTC pain meds which are helpful  Also noting mood swings and palpitations that are new  Denies hx of depression, has some anxiety but not requiring meds   She denies any C/O abnormal vaginal discharge or irritation.   Denies Urinary complaints  Denies breast changes    Sexually active: not at present  No c/o pain bldg last sexual activity  Decreased libido continues  Contraception: none    She reports she feels safe at home.   Lifestyle/exercise: minimal low motivation  Dietary calcium/vit D  intake: moderate, counseled on servings/supplements    HEALTH MAINTENANCE SCREENINGS:     Previous pap smears and ASCCP screening guidelines have been reviewed.   Last Pap:  06/30/2023; Next due 2028  History of abnormal pap: No,   Last mammogram:  11/27/2024      Hereditary Cancer Screening  Cancer-related family history is negative for Breast cancer, Cervical cancer, Uterine cancer, Endometrial cancer, Colon cancer, and Ovarian cancer.    Substance Abuse Screening Completed. See hx and flowsheet.  Review of Systems   Constitutional:  Negative for appetite change, chills, fatigue, fever and unexpected weight change.   HENT: Negative.     Eyes: Negative.    Respiratory:  Negative for chest tightness and shortness of breath.    Cardiovascular:  Negative for chest pain and palpitations.   Gastrointestinal:  Negative for abdominal pain, constipation and vomiting.   Endocrine: Negative for cold intolerance and heat intolerance.   Genitourinary:         As per HPI   Musculoskeletal:  Negative for back pain, joint swelling and neck pain.   Skin:  Negative for color change and rash.   Neurological:  Negative for dizziness, weakness and numbness.   Hematological:  Does not bruise/bleed easily.   Psychiatric/Behavioral: Negative.       The following portions of the patient's history were reviewed and updated as appropriate: allergies, current medications, past  "family history, past medical history, past social history, past surgical history, and problem list.         Objective   /78 (BP Location: Left arm, Patient Position: Sitting, Cuff Size: Standard)   Ht 5' 6\" (1.676 m)   Wt 97.8 kg (215 lb 9.6 oz)   LMP 06/26/2025   BMI 34.80 kg/m²     Physical Exam  Constitutional:       General: She is not in acute distress.     Appearance: Normal appearance.   Genitourinary:      Vulva and rectum normal.      No lesions in the vagina.      Right Labia: No rash or lesions.     Left Labia: No lesions or rash.     No vaginal discharge, erythema, tenderness or bleeding.        Right Adnexa: not tender and no mass present.     Left Adnexa: not tender and no mass present.     No cervical motion tenderness, discharge or friability.      Uterus is not enlarged or tender.      No urethral prolapse present.      Pelvic exam was performed with patient in the lithotomy position.   Breasts:     Breasts are symmetrical.      Right: No inverted nipple, mass, nipple discharge, skin change or tenderness.      Left: No inverted nipple, mass, nipple discharge, skin change or tenderness.   HENT:      Head: Normocephalic and atraumatic.     Cardiovascular:      Rate and Rhythm: Normal rate.      Heart sounds: No murmur heard.  Pulmonary:      Effort: Pulmonary effort is normal.      Breath sounds: Normal breath sounds.   Abdominal:      General: There is no distension.      Palpations: Abdomen is soft.      Tenderness: There is no abdominal tenderness.     Musculoskeletal:         General: Normal range of motion.      Cervical back: Normal range of motion.   Lymphadenopathy:      Cervical: No cervical adenopathy.     Neurological:      Mental Status: She is alert and oriented to person, place, and time.     Skin:     General: Skin is warm and dry.     Psychiatric:         Mood and Affect: Mood normal.         Behavior: Behavior normal.   Vitals reviewed.                      "

## 2025-07-05 DIAGNOSIS — E03.9 ACQUIRED HYPOTHYROIDISM: ICD-10-CM

## 2025-07-06 DIAGNOSIS — J45.909 UNCOMPLICATED ASTHMA, UNSPECIFIED ASTHMA SEVERITY, UNSPECIFIED WHETHER PERSISTENT: ICD-10-CM

## 2025-07-06 DIAGNOSIS — J40 BRONCHITIS: ICD-10-CM

## 2025-07-07 RX ORDER — LEVOTHYROXINE SODIUM 100 UG/1
100 TABLET ORAL DAILY
Qty: 90 TABLET | Refills: 1 | Status: SHIPPED | OUTPATIENT
Start: 2025-07-07

## 2025-07-09 DIAGNOSIS — J40 BRONCHITIS: ICD-10-CM

## 2025-07-09 DIAGNOSIS — J45.909 UNCOMPLICATED ASTHMA, UNSPECIFIED ASTHMA SEVERITY, UNSPECIFIED WHETHER PERSISTENT: ICD-10-CM

## 2025-07-09 RX ORDER — ALBUTEROL SULFATE 90 UG/1
INHALANT RESPIRATORY (INHALATION)
Qty: 8.5 G | Refills: 2 | Status: SHIPPED | OUTPATIENT
Start: 2025-07-09

## 2025-07-10 RX ORDER — ALBUTEROL SULFATE 90 UG/1
2 INHALANT RESPIRATORY (INHALATION) EVERY 6 HOURS PRN
Qty: 8 G | Refills: 0 | OUTPATIENT
Start: 2025-07-10